# Patient Record
Sex: MALE | Race: WHITE | Employment: OTHER | URBAN - METROPOLITAN AREA
[De-identification: names, ages, dates, MRNs, and addresses within clinical notes are randomized per-mention and may not be internally consistent; named-entity substitution may affect disease eponyms.]

---

## 2019-07-11 ENCOUNTER — OFFICE VISIT (OUTPATIENT)
Dept: VASCULAR SURGERY | Age: 74
End: 2019-07-11
Payer: MEDICARE

## 2019-07-11 DIAGNOSIS — Z98.890 S/P AAA (ABDOMINAL AORTIC ANEURYSM) REPAIR: Primary | Chronic | ICD-10-CM

## 2019-07-11 DIAGNOSIS — Z86.79 S/P AAA (ABDOMINAL AORTIC ANEURYSM) REPAIR: Primary | Chronic | ICD-10-CM

## 2019-07-11 PROCEDURE — G8427 DOCREV CUR MEDS BY ELIG CLIN: HCPCS | Performed by: SURGERY

## 2019-07-11 PROCEDURE — G8421 BMI NOT CALCULATED: HCPCS | Performed by: SURGERY

## 2019-07-11 PROCEDURE — 3017F COLORECTAL CA SCREEN DOC REV: CPT | Performed by: SURGERY

## 2019-07-11 PROCEDURE — 4040F PNEUMOC VAC/ADMIN/RCVD: CPT | Performed by: SURGERY

## 2019-07-11 PROCEDURE — 1036F TOBACCO NON-USER: CPT | Performed by: SURGERY

## 2019-07-11 PROCEDURE — 99213 OFFICE O/P EST LOW 20 MIN: CPT | Performed by: SURGERY

## 2019-07-11 PROCEDURE — 1123F ACP DISCUSS/DSCN MKR DOCD: CPT | Performed by: SURGERY

## 2019-07-11 RX ORDER — LIDOCAINE 40 MG/G
CREAM TOPICAL PRN
COMMUNITY

## 2019-07-11 NOTE — PROGRESS NOTES
repair 2012       Past Surgical History:   Procedure Laterality Date    ABDOMINAL AORTIC ANEURYSM REPAIR, OPEN      AAA repair and aorto-iliac bypass    BLADDER SURGERY      resection for carcinoma   [address]    HERNIA REPAIR         History reviewed. No pertinent family history.     Social History     Socioeconomic History    Marital status:      Spouse name: Not on file    Number of children: Not on file    Years of education: Not on file    Highest education level: Not on file   Occupational History    Not on file   Social Needs    Financial resource strain: Not on file    Food insecurity:     Worry: Not on file     Inability: Not on file    Transportation needs:     Medical: Not on file     Non-medical: Not on file   Tobacco Use    Smoking status: Former Smoker     Packs/day: 2.00     Types: Cigarettes     Last attempt to quit: 2005     Years since quittin.2    Smokeless tobacco: Never Used   Substance and Sexual Activity    Alcohol use: Yes     Comment: rare    Drug use: Not on file    Sexual activity: Not on file   Lifestyle    Physical activity:     Days per week: Not on file     Minutes per session: Not on file    Stress: Not on file   Relationships    Social connections:     Talks on phone: Not on file     Gets together: Not on file     Attends Hoahaoism service: Not on file     Active member of club or organization: Not on file     Attends meetings of clubs or organizations: Not on file     Relationship status: Not on file    Intimate partner violence:     Fear of current or ex partner: Not on file     Emotionally abused: Not on file     Physically abused: Not on file     Forced sexual activity: Not on file   Other Topics Concern    Not on file   Social History Narrative    Not on file       Review of Systems:    Eyes:  No blurring,

## 2019-07-24 ENCOUNTER — HOSPITAL ENCOUNTER (OUTPATIENT)
Dept: CARDIOLOGY | Age: 74
Discharge: HOME | End: 2019-07-24
Payer: MEDICARE

## 2019-07-24 DIAGNOSIS — Z98.890 S/P AAA (ABDOMINAL AORTIC ANEURYSM) REPAIR: Chronic | ICD-10-CM

## 2019-07-24 DIAGNOSIS — Z86.79 S/P AAA (ABDOMINAL AORTIC ANEURYSM) REPAIR: Chronic | ICD-10-CM

## 2019-07-24 PROCEDURE — 93978 VASCULAR STUDY: CPT

## 2019-07-25 ENCOUNTER — TELEPHONE (OUTPATIENT)
Dept: VASCULAR SURGERY | Age: 74
End: 2019-07-25

## 2019-08-16 ENCOUNTER — HOSPITAL ENCOUNTER (OUTPATIENT)
Dept: CT IMAGING | Age: 74
Discharge: HOME | End: 2019-08-18
Payer: MEDICARE

## 2019-08-16 DIAGNOSIS — R93.89 ABNORMAL RADIOLOGIC DENSITY: ICD-10-CM

## 2019-08-16 PROCEDURE — 71250 CT THORAX DX C-: CPT

## 2021-07-21 ENCOUNTER — TELEPHONE (OUTPATIENT)
Dept: VASCULAR SURGERY | Age: 76
End: 2021-07-21

## 2021-07-22 ENCOUNTER — TELEPHONE (OUTPATIENT)
Dept: VASCULAR SURGERY | Age: 76
End: 2021-07-22

## 2021-07-22 ENCOUNTER — OFFICE VISIT (OUTPATIENT)
Dept: VASCULAR SURGERY | Age: 76
End: 2021-07-22
Payer: MEDICARE

## 2021-07-22 VITALS — BODY MASS INDEX: 26.52 KG/M2 | WEIGHT: 175 LBS | HEIGHT: 68 IN

## 2021-07-22 DIAGNOSIS — R09.89 PROMINENT ABDOMINAL AORTIC PULSE: ICD-10-CM

## 2021-07-22 DIAGNOSIS — Z87.891 HISTORY OF TOBACCO USE: ICD-10-CM

## 2021-07-22 DIAGNOSIS — Z86.79 S/P AAA (ABDOMINAL AORTIC ANEURYSM) REPAIR: Primary | Chronic | ICD-10-CM

## 2021-07-22 DIAGNOSIS — Z98.890 S/P AAA (ABDOMINAL AORTIC ANEURYSM) REPAIR: Primary | Chronic | ICD-10-CM

## 2021-07-22 PROCEDURE — 4040F PNEUMOC VAC/ADMIN/RCVD: CPT | Performed by: SURGERY

## 2021-07-22 PROCEDURE — G8417 CALC BMI ABV UP PARAM F/U: HCPCS | Performed by: SURGERY

## 2021-07-22 PROCEDURE — 3017F COLORECTAL CA SCREEN DOC REV: CPT | Performed by: SURGERY

## 2021-07-22 PROCEDURE — G8427 DOCREV CUR MEDS BY ELIG CLIN: HCPCS | Performed by: SURGERY

## 2021-07-22 PROCEDURE — 99214 OFFICE O/P EST MOD 30 MIN: CPT | Performed by: SURGERY

## 2021-07-22 PROCEDURE — 1123F ACP DISCUSS/DSCN MKR DOCD: CPT | Performed by: SURGERY

## 2021-07-22 PROCEDURE — 1036F TOBACCO NON-USER: CPT | Performed by: SURGERY

## 2021-07-22 NOTE — TELEPHONE ENCOUNTER
Scheduled AAA u/s at [facility] 8/3 at 8:30 a.m, Mrs. Yunior Vargas notified and instructed.

## 2021-07-22 NOTE — PROGRESS NOTES
Chief Complaint:   Chief Complaint   Patient presents with    Follow-up     s/p AAA         HPI: Patient came to the office, for follow-up evaluation of resection of abdominal aortic aneurysm that was done many years ago, came to the office after 2 years overall doing well, denies any abdominal pain or back pain    Patient denies any significant changes in his health system since last time      Patient denies any focal lateralizing neurological symptoms like loss of speech, vision or loss of function of extremity    Patient can walk a few blocks, mainly limited due to arthritis of the knee joints, and denies any symptoms of rest pain    No Known Allergies    Current Outpatient Medications   Medication Sig Dispense Refill    lidocaine (LMX) 4 % cream Apply topically as needed for Pain Apply topically as needed.  sertraline (ZOLOFT) 25 MG tablet Take 25 mg by mouth daily      pravastatin (PRAVACHOL) 80 MG tablet Take 40 mg by mouth daily      omeprazole (PRILOSEC) 20 MG capsule Take 40 mg by mouth daily      sucralfate (CARAFATE) 1 GM tablet Take 1 g by mouth 4 times daily      levothyroxine (SYNTHROID) 25 MCG tablet Take 25 mcg by mouth Daily      Misc Natural Products (FIBER 7) POWD Take  by mouth.  lisinopril (PRINIVIL;ZESTRIL) 10 MG tablet Take 10 mg by mouth daily.  metoprolol (LOPRESSOR) 50 MG tablet Take 50 mg by mouth 2 times daily.  amlodipine (NORVASC) 10 MG tablet Take 10 mg by mouth daily.  Coenzyme Q10 (CO Q 10) 10 MG CAPS Take  by mouth daily.  fish oil-omega-3 fatty acids 1000 MG capsule Take 2 g by mouth daily.  aspirin 81 MG chewable tablet Take 81 mg by mouth daily.  vitamin D (CHOLECALCIFEROL) 1000 UNIT TABS tablet Take 2,000 Units by mouth daily. No current facility-administered medications for this visit.        Past Medical History:   Diagnosis Date    Abdominal aortic aneurysm ([facility] [address]     CAD (coronary artery disease)     Cancer [facility])     bladder s/p resection     History of heart attack 2004    Hyperlipidemia     Hypertension     Hypothyroidism     Prominent abdominal aortic pulse 2021    S/P AAA (abdominal aortic aneurysm) repair 2012       Past Surgical History:   Procedure Laterality Date    ABDOMINAL AORTIC ANEURYSM REPAIR, OPEN      AAA repair and aorto-iliac bypass    BLADDER SURGERY      resection for carcinoma   [facility] [place]      [facility]    HERNIA REPAIR         History reviewed. No pertinent family history. Social History     Socioeconomic History    Marital status:      Spouse name: Not on file    Number of children: Not on file    Years of education: Not on file    Highest education level: Not on file   Occupational History    Not on file   Tobacco Use    Smoking status: Former Smoker     Packs/day: 2.00     Types: Cigarettes     Quit date: 2005     Years since quittin.2    Smokeless tobacco: Never Used   Vaping Use    Vaping Use: Never used   Substance and Sexual Activity    Alcohol use: Yes     Comment: rare    Drug use: Never    Sexual activity: Not on file   Other Topics Concern    Not on file   Social History Narrative    Not on file     Social Determinants of Health     Financial Resource Strain:     Difficulty of Paying Living Expenses:    Food Insecurity:     Worried About Running Out of Food in the Last Year:     [address] in the Last Year:    Transportation Needs:     Lack of Transportation (Medical):      Lack of Transportation (Non-Medical):    Physical Activity:     Days of Exercise per Week:     Minutes of Exercise per Session:    Stress:     Feeling of Stress :    Social Connections:     Frequency of Communication with Friends and Family:     Frequency of Social Gatherings with Friends and Family:     Attends Anabaptism Services:  Active Member of Clubs or Organizations:     Attends Club or Organization Meetings:     Marital Status:    Intimate Partner Violence:     Fear of Current or Ex-Partner:     Emotionally Abused:     Physically Abused:     Sexually Abused:        Review of Systems:    Eyes:  No blurring, diplopia or vision loss. Respiratory:  No cough, pleuritic chest pain, dyspnea, or wheezing. Cardiovascular: No angina, palpitations . Hypertension, hyperlipidemia  Musculoskeletal:  No arthritis or weakness. Neurologic:  No paralysis, paresis, paresthesia, seizures or headache. Endocrinology: Hypothyroidism  Gastrointestinal: GERD        Physical Exam:  General appearance:  Alert, awake, oriented x 3. No distress. Eyes:  Conjunctivae appear normal; PERRL  Neck:  No jugular venous distention, lymphadenopathy or thyromegaly. No evidence of carotid bruit  Lungs:  Clear to ausculation bilaterally. No rhonchi, crackles, wheezes  Heart:  Regular rate and rhythm. No rub or murmur  Abdomen:  Soft, non-tender. No masses, organomegaly. Minimally prominent pulse noted on deep palpation  Musculoskeletal : No joint effusions, tenderness swelling    Neuro: Speech is intact. Moving all extremities. No focal motor or sensory deficits      Extremities:  Both feet are warm to touch. The color of both feet is normal.        Pulses Right  Left    Brachial 3 3    Radial    3=normal   Femoral 2 2  2=diminished   Popliteal    1=barely palpable   Dorsalis pedis    0=absent   Posterior tibial 2 2  4=aneurysmal             Other pertinent information:1. The past medical records were reviewed. Assessment:    1. S/P AAA (abdominal aortic aneurysm) repair    2. Prominent abdominal aortic pulse    3.  History of tobacco use              Plan:       Discussed the patient regarding all options, recommended follow-up ultrasound abdominal aorta but call and come to the hospital with abdominal pain and back pain, all his questions were answered, to call 2 days after the test is done to discuss the test results              Patient was instructed to continue walking program and to call if any worsening of symptoms and to call if any focal lateralizing neurological symptoms like loss of speech, vision or loss of function of extremity. All the questions were answered. Orders Placed This Encounter   Procedures    US RETROPERITONEAL LIMITED             Indicated follow-up: Return in about 2 years (around 7/22/2023), or if symptoms worsen or fail to improve.

## 2021-08-03 ENCOUNTER — HOSPITAL ENCOUNTER (OUTPATIENT)
Dept: ULTRASOUND IMAGING | Age: 76
Discharge: HOME | End: 2021-08-05
Payer: MEDICARE

## 2021-08-03 DIAGNOSIS — Z86.79 S/P AAA (ABDOMINAL AORTIC ANEURYSM) REPAIR: Chronic | ICD-10-CM

## 2021-08-03 DIAGNOSIS — R09.89 PROMINENT ABDOMINAL AORTIC PULSE: ICD-10-CM

## 2021-08-03 DIAGNOSIS — Z98.890 S/P AAA (ABDOMINAL AORTIC ANEURYSM) REPAIR: Chronic | ICD-10-CM

## 2021-08-09 ENCOUNTER — HOSPITAL ENCOUNTER (OUTPATIENT)
Dept: ULTRASOUND IMAGING | Age: 76
Discharge: HOME | End: 2021-08-11
Payer: MEDICARE

## 2021-08-09 PROCEDURE — 76775 US EXAM ABDO BACK WALL LIM: CPT

## 2021-08-10 ENCOUNTER — TELEPHONE (OUTPATIENT)
Dept: VASCULAR SURGERY | Age: 76
End: 2021-08-10

## 2021-08-10 NOTE — TELEPHONE ENCOUNTER
Called and informed patient that ultrasound of abdomen satisfactory, no change, keep next appointment.

## 2022-03-15 ENCOUNTER — HOSPITAL ENCOUNTER (OUTPATIENT)
Age: 77
Discharge: HOME | End: 2022-03-17

## 2022-03-15 LAB
ABO/RH: NORMAL
ANTIBODY SCREEN: NORMAL

## 2022-03-15 PROCEDURE — 87081 CULTURE SCREEN ONLY: CPT

## 2022-03-15 PROCEDURE — 86901 BLOOD TYPING SEROLOGIC RH(D): CPT

## 2022-03-15 PROCEDURE — 86900 BLOOD TYPING SEROLOGIC ABO: CPT

## 2022-03-15 PROCEDURE — 86850 RBC ANTIBODY SCREEN: CPT

## 2022-03-17 LAB — MRSA CULTURE ONLY: NORMAL

## 2022-03-23 ENCOUNTER — HOSPITAL ENCOUNTER (OUTPATIENT)
Age: 77
Discharge: HOME | End: 2022-03-25

## 2022-03-23 LAB
ANION GAP SERPL CALCULATED.3IONS-SCNC: 9 MMOL/L (ref 7–16)
BUN BLDV-MCNC: 34 MG/DL (ref 6–23)
CALCIUM SERPL-MCNC: 8.4 MG/DL (ref 8.6–10.2)
CHLORIDE BLD-SCNC: 106 MMOL/L (ref 98–107)
CO2: 21 MMOL/L (ref 22–29)
CREAT SERPL-MCNC: 1.7 MG/DL (ref 0.7–1.2)
GFR AFRICAN AMERICAN: 48
GFR NON-AFRICAN AMERICAN: 39 ML/MIN/1.73
GLUCOSE BLD-MCNC: 180 MG/DL (ref 74–99)
HCT VFR BLD CALC: 29.7 % (ref 37–54)
HEMOGLOBIN: 9.1 G/DL (ref 12.5–16.5)
MCH RBC QN AUTO: 30.8 PG (ref 26–35)
MCHC RBC AUTO-ENTMCNC: 30.6 % (ref 32–34.5)
MCV RBC AUTO: 100.7 FL (ref 80–99.9)
PDW BLD-RTO: 12.8 FL (ref 11.5–15)
PLATELET # BLD: 156 E9/L (ref 130–450)
PMV BLD AUTO: 10.8 FL (ref 7–12)
POTASSIUM SERPL-SCNC: 4.7 MMOL/L (ref 3.5–5)
RBC # BLD: 2.95 E12/L (ref 3.8–5.8)
SODIUM BLD-SCNC: 136 MMOL/L (ref 132–146)
WBC # BLD: 11 E9/L (ref 4.5–11.5)

## 2022-03-23 PROCEDURE — 80048 BASIC METABOLIC PNL TOTAL CA: CPT

## 2022-03-23 PROCEDURE — 85027 COMPLETE CBC AUTOMATED: CPT

## 2022-03-24 ENCOUNTER — HOSPITAL ENCOUNTER (OUTPATIENT)
Age: 77
Discharge: HOME | End: 2022-03-26

## 2022-03-24 LAB
ANION GAP SERPL CALCULATED.3IONS-SCNC: 13 MMOL/L (ref 7–16)
BUN BLDV-MCNC: 42 MG/DL (ref 6–23)
CALCIUM SERPL-MCNC: 8.6 MG/DL (ref 8.6–10.2)
CHLORIDE BLD-SCNC: 107 MMOL/L (ref 98–107)
CO2: 19 MMOL/L (ref 22–29)
CREAT SERPL-MCNC: 1.8 MG/DL (ref 0.7–1.2)
GFR AFRICAN AMERICAN: 45
GFR NON-AFRICAN AMERICAN: 37 ML/MIN/1.73
GLUCOSE BLD-MCNC: 156 MG/DL (ref 74–99)
HCT VFR BLD CALC: 22.8 % (ref 37–54)
HCT VFR BLD CALC: 23.7 % (ref 37–54)
HEMOGLOBIN: 7.4 G/DL (ref 12.5–16.5)
HEMOGLOBIN: 7.7 G/DL (ref 12.5–16.5)
MCH RBC QN AUTO: 31.4 PG (ref 26–35)
MCHC RBC AUTO-ENTMCNC: 32.5 % (ref 32–34.5)
MCV RBC AUTO: 96.7 FL (ref 80–99.9)
PDW BLD-RTO: 12.9 FL (ref 11.5–15)
PLATELET # BLD: 147 E9/L (ref 130–450)
PMV BLD AUTO: 10.8 FL (ref 7–12)
POTASSIUM SERPL-SCNC: 4.6 MMOL/L (ref 3.5–5)
RBC # BLD: 2.45 E12/L (ref 3.8–5.8)
SODIUM BLD-SCNC: 139 MMOL/L (ref 132–146)
WBC # BLD: 15.8 E9/L (ref 4.5–11.5)

## 2022-03-24 PROCEDURE — 85014 HEMATOCRIT: CPT

## 2022-03-24 PROCEDURE — 85027 COMPLETE CBC AUTOMATED: CPT

## 2022-03-24 PROCEDURE — 80048 BASIC METABOLIC PNL TOTAL CA: CPT

## 2022-03-24 PROCEDURE — 85018 HEMOGLOBIN: CPT

## 2023-01-13 ENCOUNTER — APPOINTMENT (OUTPATIENT)
Dept: CT IMAGING | Age: 78
DRG: 025 | End: 2023-01-13
Payer: MEDICARE

## 2023-01-13 ENCOUNTER — ANESTHESIA EVENT (OUTPATIENT)
Dept: OPERATING ROOM | Age: 78
End: 2023-01-13
Payer: MEDICARE

## 2023-01-13 ENCOUNTER — APPOINTMENT (OUTPATIENT)
Dept: GENERAL RADIOLOGY | Age: 78
DRG: 025 | End: 2023-01-13
Payer: MEDICARE

## 2023-01-13 ENCOUNTER — HOSPITAL ENCOUNTER (INPATIENT)
Age: 78
LOS: 5 days | Discharge: HOME | DRG: 025 | End: 2023-01-18
Attending: EMERGENCY MEDICINE | Admitting: SURGERY
Payer: MEDICARE

## 2023-01-13 DIAGNOSIS — S06.5XAA SDH (SUBDURAL HEMATOMA): Primary | ICD-10-CM

## 2023-01-13 LAB
ALBUMIN SERPL-MCNC: 3.6 G/DL (ref 3.5–5.2)
ALP BLD-CCNC: 70 U/L (ref 40–129)
ALT SERPL-CCNC: 15 U/L (ref 0–40)
ANGLE (CLOT STRENGTH): 75.4 DEGREE (ref 59–74)
ANION GAP SERPL CALCULATED.3IONS-SCNC: 13 MMOL/L (ref 7–16)
APTT: 30.8 SEC (ref 24.5–35.1)
AST SERPL-CCNC: 14 U/L (ref 0–39)
BASOPHILS ABSOLUTE: 0.05 E9/L (ref 0–0.2)
BASOPHILS RELATIVE PERCENT: 0.7 % (ref 0–2)
BILIRUB SERPL-MCNC: 0.3 MG/DL (ref 0–1.2)
BUN BLDV-MCNC: 26 MG/DL (ref 6–23)
CALCIUM SERPL-MCNC: 9.1 MG/DL (ref 8.6–10.2)
CHLORIDE BLD-SCNC: 108 MMOL/L (ref 98–107)
CO2: 20 MMOL/L (ref 22–29)
CREAT SERPL-MCNC: 1.6 MG/DL (ref 0.7–1.2)
EOSINOPHILS ABSOLUTE: 0.14 E9/L (ref 0.05–0.5)
EOSINOPHILS RELATIVE PERCENT: 2.1 % (ref 0–6)
EPL-TEG: 0.3 % (ref 0–15)
G-TEG: 7.7 K D/SC (ref 4.5–11)
GFR SERPL CREATININE-BSD FRML MDRD: 44 ML/MIN/1.73
GLUCOSE BLD-MCNC: 159 MG/DL (ref 74–99)
HCT VFR BLD CALC: 41.9 % (ref 37–54)
HEMOGLOBIN: 13.7 G/DL (ref 12.5–16.5)
IMMATURE GRANULOCYTES #: 0.01 E9/L
IMMATURE GRANULOCYTES %: 0.1 % (ref 0–5)
INR BLD: 1.2
K (CLOTTING TIME): 1.3 MIN (ref 1–3)
LY30 (FIBRINOLYSIS): 0.3 % (ref 0–8)
LYMPHOCYTES ABSOLUTE: 1.51 E9/L (ref 1.5–4)
LYMPHOCYTES RELATIVE PERCENT: 22.4 % (ref 20–42)
MA (MAX AMPLITUDE): 60.5 MM (ref 50–70)
MCH RBC QN AUTO: 30.8 PG (ref 26–35)
MCHC RBC AUTO-ENTMCNC: 32.7 % (ref 32–34.5)
MCV RBC AUTO: 94.2 FL (ref 80–99.9)
MONOCYTES ABSOLUTE: 0.69 E9/L (ref 0.1–0.95)
MONOCYTES RELATIVE PERCENT: 10.2 % (ref 2–12)
NEUTROPHILS ABSOLUTE: 4.35 E9/L (ref 1.8–7.3)
NEUTROPHILS RELATIVE PERCENT: 64.5 % (ref 43–80)
PDW BLD-RTO: 13.3 FL (ref 11.5–15)
PLATELET # BLD: 149 E9/L (ref 130–450)
PMV BLD AUTO: 9.6 FL (ref 7–12)
POTASSIUM SERPL-SCNC: 3.8 MMOL/L (ref 3.5–5)
PROTHROMBIN TIME: 12.6 SEC (ref 9.3–12.4)
R (REACTION TIME): 3.1 MIN (ref 5–10)
RBC # BLD: 4.45 E12/L (ref 3.8–5.8)
SODIUM BLD-SCNC: 141 MMOL/L (ref 132–146)
TOTAL PROTEIN: 6.9 G/DL (ref 6.4–8.3)
WBC # BLD: 6.8 E9/L (ref 4.5–11.5)

## 2023-01-13 PROCEDURE — 87081 CULTURE SCREEN ONLY: CPT

## 2023-01-13 PROCEDURE — 71045 X-RAY EXAM CHEST 1 VIEW: CPT

## 2023-01-13 PROCEDURE — 74177 CT ABD & PELVIS W/CONTRAST: CPT

## 2023-01-13 PROCEDURE — 70450 CT HEAD/BRAIN W/O DYE: CPT

## 2023-01-13 PROCEDURE — 6360000002 HC RX W HCPCS: Performed by: EMERGENCY MEDICINE

## 2023-01-13 PROCEDURE — 85347 COAGULATION TIME ACTIVATED: CPT

## 2023-01-13 PROCEDURE — 85610 PROTHROMBIN TIME: CPT

## 2023-01-13 PROCEDURE — 85730 THROMBOPLASTIN TIME PARTIAL: CPT

## 2023-01-13 PROCEDURE — 85384 FIBRINOGEN ACTIVITY: CPT

## 2023-01-13 PROCEDURE — 2580000003 HC RX 258: Performed by: STUDENT IN AN ORGANIZED HEALTH CARE EDUCATION/TRAINING PROGRAM

## 2023-01-13 PROCEDURE — 6360000002 HC RX W HCPCS: Performed by: STUDENT IN AN ORGANIZED HEALTH CARE EDUCATION/TRAINING PROGRAM

## 2023-01-13 PROCEDURE — 6360000004 HC RX CONTRAST MEDICATION: Performed by: RADIOLOGY

## 2023-01-13 PROCEDURE — 80053 COMPREHEN METABOLIC PANEL: CPT

## 2023-01-13 PROCEDURE — 85576 BLOOD PLATELET AGGREGATION: CPT

## 2023-01-13 PROCEDURE — 2000000000 HC ICU R&B

## 2023-01-13 PROCEDURE — 6370000000 HC RX 637 (ALT 250 FOR IP): Performed by: STUDENT IN AN ORGANIZED HEALTH CARE EDUCATION/TRAINING PROGRAM

## 2023-01-13 PROCEDURE — 72125 CT NECK SPINE W/O DYE: CPT

## 2023-01-13 PROCEDURE — 71260 CT THORAX DX C+: CPT

## 2023-01-13 PROCEDURE — 85025 COMPLETE CBC W/AUTO DIFF WBC: CPT

## 2023-01-13 PROCEDURE — 36415 COLL VENOUS BLD VENIPUNCTURE: CPT

## 2023-01-13 PROCEDURE — 99285 EMERGENCY DEPT VISIT HI MDM: CPT

## 2023-01-13 RX ORDER — OXYCODONE HYDROCHLORIDE 10 MG/1
10 TABLET ORAL EVERY 4 HOURS PRN
Status: DISCONTINUED | OUTPATIENT
Start: 2023-01-13 | End: 2023-01-15

## 2023-01-13 RX ORDER — ACETAMINOPHEN 325 MG/1
650 TABLET ORAL EVERY 6 HOURS PRN
Status: DISCONTINUED | OUTPATIENT
Start: 2023-01-13 | End: 2023-01-18 | Stop reason: HOSPADM

## 2023-01-13 RX ORDER — LABETALOL HYDROCHLORIDE 5 MG/ML
10 INJECTION, SOLUTION INTRAVENOUS EVERY 30 MIN PRN
Status: DISCONTINUED | OUTPATIENT
Start: 2023-01-13 | End: 2023-01-18 | Stop reason: HOSPADM

## 2023-01-13 RX ORDER — PRAVASTATIN SODIUM 20 MG
80 TABLET ORAL NIGHTLY
Status: DISCONTINUED | OUTPATIENT
Start: 2023-01-13 | End: 2023-01-18 | Stop reason: HOSPADM

## 2023-01-13 RX ORDER — LEVETIRACETAM 5 MG/ML
500 INJECTION INTRAVASCULAR ONCE
Status: COMPLETED | OUTPATIENT
Start: 2023-01-13 | End: 2023-01-13

## 2023-01-13 RX ORDER — AMLODIPINE BESYLATE 10 MG/1
10 TABLET ORAL DAILY
Status: DISCONTINUED | OUTPATIENT
Start: 2023-01-14 | End: 2023-01-18 | Stop reason: HOSPADM

## 2023-01-13 RX ORDER — METOPROLOL TARTRATE 50 MG/1
50 TABLET, FILM COATED ORAL 2 TIMES DAILY
Status: DISCONTINUED | OUTPATIENT
Start: 2023-01-13 | End: 2023-01-18 | Stop reason: HOSPADM

## 2023-01-13 RX ORDER — ONDANSETRON 2 MG/ML
4 INJECTION INTRAMUSCULAR; INTRAVENOUS EVERY 6 HOURS PRN
Status: DISCONTINUED | OUTPATIENT
Start: 2023-01-13 | End: 2023-01-18 | Stop reason: HOSPADM

## 2023-01-13 RX ORDER — SODIUM CHLORIDE 0.9 % (FLUSH) 0.9 %
5-40 SYRINGE (ML) INJECTION PRN
Status: DISCONTINUED | OUTPATIENT
Start: 2023-01-13 | End: 2023-01-18 | Stop reason: HOSPADM

## 2023-01-13 RX ORDER — POLYETHYLENE GLYCOL 3350 17 G/17G
17 POWDER, FOR SOLUTION ORAL 2 TIMES DAILY
Status: DISCONTINUED | OUTPATIENT
Start: 2023-01-13 | End: 2023-01-18 | Stop reason: HOSPADM

## 2023-01-13 RX ORDER — SODIUM CHLORIDE 0.9 % (FLUSH) 0.9 %
5-40 SYRINGE (ML) INJECTION EVERY 12 HOURS SCHEDULED
Status: DISCONTINUED | OUTPATIENT
Start: 2023-01-13 | End: 2023-01-18 | Stop reason: HOSPADM

## 2023-01-13 RX ORDER — ROSUVASTATIN CALCIUM 20 MG/1
20 TABLET, COATED ORAL DAILY
COMMUNITY

## 2023-01-13 RX ORDER — LEVETIRACETAM 500 MG/1
500 TABLET ORAL 2 TIMES DAILY
Status: DISCONTINUED | OUTPATIENT
Start: 2023-01-14 | End: 2023-01-18 | Stop reason: HOSPADM

## 2023-01-13 RX ORDER — SODIUM CHLORIDE 9 MG/ML
25 INJECTION, SOLUTION INTRAVENOUS PRN
Status: DISCONTINUED | OUTPATIENT
Start: 2023-01-13 | End: 2023-01-18 | Stop reason: HOSPADM

## 2023-01-13 RX ORDER — HYDRALAZINE HYDROCHLORIDE 20 MG/ML
10 INJECTION INTRAMUSCULAR; INTRAVENOUS EVERY 30 MIN PRN
Status: DISCONTINUED | OUTPATIENT
Start: 2023-01-13 | End: 2023-01-18 | Stop reason: HOSPADM

## 2023-01-13 RX ORDER — OXYCODONE HYDROCHLORIDE 5 MG/1
5 TABLET ORAL EVERY 4 HOURS PRN
Status: DISCONTINUED | OUTPATIENT
Start: 2023-01-13 | End: 2023-01-15

## 2023-01-13 RX ORDER — LEVETIRACETAM 5 MG/ML
500 INJECTION INTRAVASCULAR EVERY 12 HOURS
Status: DISCONTINUED | OUTPATIENT
Start: 2023-01-14 | End: 2023-01-13

## 2023-01-13 RX ORDER — ONDANSETRON 4 MG/1
4 TABLET, ORALLY DISINTEGRATING ORAL EVERY 8 HOURS PRN
Status: DISCONTINUED | OUTPATIENT
Start: 2023-01-13 | End: 2023-01-18 | Stop reason: HOSPADM

## 2023-01-13 RX ADMIN — SODIUM CHLORIDE, PRESERVATIVE FREE 10 ML: 5 INJECTION INTRAVENOUS at 20:30

## 2023-01-13 RX ADMIN — METOPROLOL TARTRATE 50 MG: 50 TABLET, FILM COATED ORAL at 20:30

## 2023-01-13 RX ADMIN — POLYETHYLENE GLYCOL 3350 17 G: 17 POWDER, FOR SOLUTION ORAL at 20:30

## 2023-01-13 RX ADMIN — IOPAMIDOL 90 ML: 755 INJECTION, SOLUTION INTRAVENOUS at 20:02

## 2023-01-13 RX ADMIN — PRAVASTATIN SODIUM 80 MG: 20 TABLET ORAL at 20:34

## 2023-01-13 RX ADMIN — LEVETIRACETAM 500 MG: 5 INJECTION INTRAVENOUS at 17:28

## 2023-01-13 RX ADMIN — HYDRALAZINE HYDROCHLORIDE 10 MG: 20 INJECTION INTRAMUSCULAR; INTRAVENOUS at 22:55

## 2023-01-13 ASSESSMENT — PAIN SCALES - GENERAL: PAINLEVEL_OUTOF10: 2

## 2023-01-13 ASSESSMENT — PAIN DESCRIPTION - PAIN TYPE: TYPE: ACUTE PAIN

## 2023-01-13 ASSESSMENT — PAIN DESCRIPTION - ORIENTATION: ORIENTATION: MID;RIGHT;LEFT

## 2023-01-13 ASSESSMENT — LIFESTYLE VARIABLES
HOW OFTEN DO YOU HAVE A DRINK CONTAINING ALCOHOL: NEVER
HOW MANY STANDARD DRINKS CONTAINING ALCOHOL DO YOU HAVE ON A TYPICAL DAY: PATIENT DOES NOT DRINK

## 2023-01-13 ASSESSMENT — PAIN DESCRIPTION - LOCATION: LOCATION: HEAD

## 2023-01-13 ASSESSMENT — PAIN DESCRIPTION - DESCRIPTORS: DESCRIPTORS: OTHER (COMMENT)

## 2023-01-13 ASSESSMENT — PAIN DESCRIPTION - ONSET: ONSET: ON-GOING

## 2023-01-13 ASSESSMENT — PAIN DESCRIPTION - FREQUENCY: FREQUENCY: CONTINUOUS

## 2023-01-13 ASSESSMENT — PAIN - FUNCTIONAL ASSESSMENT: PAIN_FUNCTIONAL_ASSESSMENT: ACTIVITIES ARE NOT PREVENTED

## 2023-01-13 NOTE — ED NOTES
Surgery phoned to say pt would be going to surgery at 730 am tomorrow morning      Isatu Maya RN  01/13/23 3847

## 2023-01-13 NOTE — H&P
TRAUMA HISTORY & PHYSICAL  Surgical Resident/Advance Practice Nurse  1/13/2023  5:13 PM    PRIMARY SURVEY    CHIEF COMPLAINT:  Trauma consult. Patient is a 81-year-old male that presents to the ED from the [place] after a persistent right-sided headache. Patient states he fell on December 8 hitting his head on the corner of a wall. Patient states he was evaluated at that time. Patient states at that time he has had a persistent right sided headache. Patient states he fell again 3 days ago over the end table hitting her right side of his chest and his head. Patient states his headache worsened somewhat after the fall. Patient was evaluated by the [place] and sent in for CT of the head and cervical spine to evaluate for traumatic injury. Patient was found on CT of the head to have bilateral subdural hematomas worse on the left with approximate 7 mm shift. Neurosurgery was consulted for subdural hematoma. Cervical focal spine was noted to be negative for acute injury. Patient denies loss consciousness. Patient denies any neurologic deficits. Patient is on ASA 81. AIRWAY:   Airway Normal  EMS ETT Absent  Noisy respirations Absent  Retractions: Absent  Vomiting/bleeding: Absent      BREATHING:    Midaxillary breath sound left:  Normal  Midaxillary breath sound right:  Normal    Cough sound intensity:  fair   FiO2:  Room air    SMI 1500 mL.       CIRCULATION:   Femerol pulse intensity: Strong  Palpebral conjunctiva: Red    Vitals:    01/13/23 1500   BP: (!) 144/79   Pulse:    Resp: 18   Temp:    SpO2:        Vitals:    01/13/23 1442 01/13/23 1500   BP:  (!) 144/79   Pulse: 77    Resp:  18   Temp: 97.8 °F (36.6 °C)    SpO2: 98%    Weight:  172 lb (78 kg)   Height:  5' 10\" (1.778 m)        FAST EXAM: Deferred    Central Nervous System    GCS Initial 15 minutes   Eye  Motor  Verbal 4 - Opens eyes on own  6 - Follows simple motor commands  5 - Alert and oriented 4 - Opens eyes on own  6 - Follows simple motor commands  5 - Alert and oriented     Neuromuscular blockade: No  Pupil size:  Left 3 mm    Right 3 mm  Pupil reaction: Yes    Wiggles fingers: Left Yes Right Yes  Wiggles toes: Left Yes   Right Yes    Hand grasp:   Left  Present      Right  Present  Plantar flexion: Left  Present      Right   Present    Loss of consciousness:  No    History Obtained From:  Patient & EMS  Private Medical Doctor: Celso Meredith DO      Pre-exisiting Medical History:  yes    Conditions: CAD, AAA, prior MI, HTN, HLD    Medications: Omeprazole, Carafate, lisinopril, statin, Zoloft, Lopressor, Norvasc, ASA 81    Allergies: No known allergies    Social History:   Tobacco use:  none  Alcohol use:  none  Illicit drug use:  no history of illicit drug use    Past Surgical History: Open AAA repair and bilateral inguinal hernia repair    Anticoagulant use: None  Antiplatelet use:   ASA    NSAID use in last 72 hours: no  Taken PCN in past:  yes  Last food/drink: Unknown  Last tetanus: Unknown    Family History:   No family history of anesthesia complications    Complaints:   Head: Moderate bilateral frontal headache  Neck:   None  Chest:   Moderate right-sided chest wall pain  Back:   None  Abdomen:   None  Extremities:   None  Comments:     Review of systems:  All negative unless otherwise noted. SECONDARY SURVEY  Head/scalp: Atraumatic    Face: Atraumatic    Eyes/ears/nose: Atraumatic    Pharynx/mouth: Atraumatic    Neck: Atraumatic     Cervical spine tenderness:   Cervical collar not indicated  Pain:  none  ROM: Intact    Chest wall: Ecchymosis/tenderness noted over the right chest wall    Heart:  Regular rate & rhythm    Abdomen: Atraumatic. Soft ND  Tenderness:  none    Pelvis: Atraumatic  Tenderness: none    Thoracolumbar spine: Atraumatic  Tenderness:  none    Genitourinary:  Atraumatic. No blood or urine noted    Rectum: Atraumatic. No blood noted. Perineum: Atraumatic. No blood or urine noted.       Extremities: Sensory normal  Motor normal    Distal Pulses  Left arm normal  Right arm normal  Left leg normal  Right leg normal    Capillary refill  Left arm normal  Right arm normal  Left leg normal  Right leg normal    Procedures in ED:   None    In the event of Emergency Blood Transfusion:  Due to the critical condition of this patient, I request the immediate release of blood products for emergency transfusion secondary to shock. I understand the increased risks incurred by the lack of complete transfusion testing.       Radiology: CT Head , CT Cervical spine , CT Chest , and CT Abdomen     Consultations:  Neurosurgery    Admission/Diagnosis: Bilateral subdural hematoma with shift    Plan of Treatment:  Admit to SICU  TEG  CT chest, abdomen and pelvis  Tertiary exam  Discussed imaging with neurosurgeon with plans for left-sided bur hole placement 1/14  Keppra 500 twice daily for seizure prophylaxis  Follow up labs/ coags    Plan discussed with Dr. Yaakov Beard    at 1/13/2023 on 5:13 PM    Electronically signed by Jose G Bradley DO on 1/13/2023 at 5:13 PM

## 2023-01-13 NOTE — ED PROVIDER NOTES
Department of Emergency Medicine   ED  Provider Note  Admit Date/RoomTime: 1/13/2023  3:20 PM  ED Room: [address] [place] [address]          History of Present Illness:  1/13/23, Time: 5:58 PM EST  Chief Complaint   Patient presents with    Fall     Tripped on steps fell down 1 step 3 weeks ago, +hit head, -LOC, -thinners. C/O headaches. Kacy Leung is a 69 y.o. male presenting to the ED for fall. Patient had mechanical fall about 3 weeks ago. Tripped and hit his head. There is no loss conscious, he is on no anticoagulation. Has been having intermittent headaches since. Was seen by his PCP and sent in for evaluation. Denies neck pain, blurred vision, cough, sputum, paresthesias, or any other symptoms. Review of Systems:   Pertinent positives and negatives are stated within HPI, all other systems reviewed and are negative.        --------------------------------------------- PAST HISTORY ---------------------------------------------  Past Medical History:  has a past medical history of Abdominal aortic aneurysm, CAD (coronary artery disease), Cancer ([facility] [address] History of heart attack, Hyperlipidemia, Hypertension, Hypothyroidism, Prominent abdominal aortic pulse, and S/P AAA (abdominal aortic aneurysm) repair. Past Surgical History:  has a past surgical history that includes Bladder surgery (1994); AAA repair, open (2007); hernia repair; Coronary angioplasty with stent; cardiovascular stress test; and Cardiac catheterization. Social History:  reports that he quit smoking about 17 years ago. His smoking use included cigarettes. He smoked an average of 2 packs per day. He has never used smokeless tobacco. He reports current alcohol use. He reports that he does not use drugs. Family History: family history is not on file. . Unless otherwise noted, family history is non contributory    The patients home medications have been reviewed.     Allergies: Patient has no known allergies. ---------------------------------------------------PHYSICAL EXAM--------------------------------------    Constitutional/General: Alert and oriented x3  Head: Normocephalic and atraumatic  Eyes: PERRL, EOMI, sclera non icteric  Mouth: Oropharynx clear, handling secretions, no trismus, no asymmetry of the posterior oropharynx or uvular edema  Neck: Supple, full ROM, no stridor, no meningeal signs  Respiratory: Lungs clear to auscultation bilaterally, no wheezes, rales, or rhonchi. Not in respiratory distress  Cardiovascular:  Regular rate. Regular rhythm. 2+ distal pulses. Equal extremity pulses. Chest: No chest wall tenderness  GI:  Abdomen Soft, Non tender, Non distended. No rebound, guarding, or rigidity. No pulsatile masses. Musculoskeletal: Moves all extremities x 4. Warm and well perfused, no clubbing, cyanosis, or edema. Capillary refill <3 seconds  Integument: skin warm and dry. No rashes. Neurologic: GCS 15, no focal deficits, symmetric strength 5/5 in the upper and lower extremities bilaterally. NIH is 0  Psychiatric: Normal Affect          -------------------------------------------------- RESULTS -------------------------------------------------  I have personally reviewed all laboratory and imaging results for this patient. Results are listed below.      LABS: (Lab results interpreted by me)  Results for orders placed or performed during the hospital encounter of 01/13/23   CBC with Auto Differential   Result Value Ref Range    WBC 6.8 4.5 - 11.5 E9/L    RBC 4.45 3.80 - 5.80 E12/L    Hemoglobin 13.7 12.5 - 16.5 g/dL    Hematocrit 41.9 37.0 - 54.0 %    MCV 94.2 80.0 - 99.9 fL    MCH 30.8 26.0 - 35.0 pg    MCHC 32.7 32.0 - 34.5 %    RDW 13.3 11.5 - 15.0 fL    Platelets 645 675 - 568 E9/L    MPV 9.6 7.0 - 12.0 fL    Neutrophils % 64.5 43.0 - 80.0 %    Immature Granulocytes % 0.1 0.0 - 5.0 %    Lymphocytes % 22.4 20.0 - 42.0 %    Monocytes % 10.2 2.0 - 12.0 %    Eosinophils % 2.1 0.0 - 6.0 %    Basophils % 0.7 0.0 - 2.0 %    Neutrophils Absolute 4.35 1.80 - 7.30 E9/L    Immature Granulocytes # 0.01 E9/L    Lymphocytes Absolute 1.51 1.50 - 4.00 E9/L    Monocytes Absolute 0.69 0.10 - 0.95 E9/L    Eosinophils Absolute 0.14 0.05 - 0.50 E9/L    Basophils Absolute 0.05 0.00 - 0.20 E9/L   Comprehensive Metabolic Panel   Result Value Ref Range    Sodium 141 132 - 146 mmol/L    Potassium 3.8 3.5 - 5.0 mmol/L    Chloride 108 (H) 98 - 107 mmol/L    CO2 20 (L) 22 - 29 mmol/L    Anion Gap 13 7 - 16 mmol/L    Glucose 159 (H) 74 - 99 mg/dL    BUN 26 (H) 6 - 23 mg/dL    Creatinine 1.6 (H) 0.7 - 1.2 mg/dL    Est, Glom Filt Rate 44 >=60 mL/min/1.73    Calcium 9.1 8.6 - 10.2 mg/dL    Total Protein 6.9 6.4 - 8.3 g/dL    Albumin 3.6 3.5 - 5.2 g/dL    Total Bilirubin 0.3 0.0 - 1.2 mg/dL    Alkaline Phosphatase 70 40 - 129 U/L    ALT 15 0 - 40 U/L    AST 14 0 - 39 U/L   APTT   Result Value Ref Range    aPTT 30.8 24.5 - 35.1 sec   Protime-INR   Result Value Ref Range    Protime 12.6 (H) 9.3 - 12.4 sec    INR 1.2    ,       RADIOLOGY:  Interpreted by Radiologist unless otherwise specified  XR CHEST PORTABLE   Final Result   No acute cardiopulmonary disease. CT HEAD WO CONTRAST   Final Result   1. Bilateral mixed subacute and acute subdural hemorrhage, left greater than   right. There is approximately 7 mm of rightward midline shift. No evidence   of transtentorial herniation. 2. No acute fracture or subluxation within the cervical spine. 3. 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 Findings discussed with Nuria Horvath via telephone on 01/13/2023 at 1610   hours [facility] Standard time. CT CERVICAL SPINE WO CONTRAST   Final Result   1. Bilateral mixed subacute and acute subdural hemorrhage, left greater than   right. There is approximately 7 mm of rightward midline shift.   No evidence   of transtentorial herniation. 2. No acute fracture or subluxation within the cervical spine. 3. 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 Findings discussed with Natali Barroso via telephone on 01/13/2023 at 1610   hours [facility] Standard time. CT CHEST W CONTRAST    (Results Pending)   CT ABDOMEN PELVIS W IV CONTRAST Additional Contrast? None    (Results Pending)         EKG Interpretation  Interpreted by emergency department physician, Dr. John Jarquin             ------------------------- NURSING NOTES AND VITALS REVIEWED ---------------------------   The nursing notes within the ED encounter and vital signs as below have been reviewed by myself  BP (!) 144/79   Pulse 77   Temp 97.8 °F (36.6 °C)   Resp 18   Ht 5' 10\" (1.778 m)   Wt 172 lb (78 kg)   SpO2 98%   BMI 24.68 kg/m²     Oxygen Saturation Interpretation: Normal    The patients available past medical records and past encounters were reviewed.         ------------------------------ ED COURSE/MEDICAL DECISION MAKING----------------------  Medications   sodium chloride flush 0.9 % injection 5-40 mL (has no administration in time range)   sodium chloride flush 0.9 % injection 5-40 mL (has no administration in time range)   0.9 % sodium chloride infusion (has no administration in time range)   acetaminophen (TYLENOL) tablet 650 mg (has no administration in time range)   oxyCODONE (ROXICODONE) immediate release tablet 5 mg (has no administration in time range)     Or   oxyCODONE HCl (OXY-IR) immediate release tablet 10 mg (has no administration in time range)   ondansetron (ZOFRAN-ODT) disintegrating tablet 4 mg (has no administration in time range)     Or   ondansetron (ZOFRAN) injection 4 mg (has no administration in time range)   polyethylene glycol (GLYCOLAX) packet 17 g (has no administration in time range)   levETIRAcetam (KEPPRA) tablet 500 mg (has no administration in time range)   levETIRAcetam (KEPPRA) 500 mg/100 mL IVPB (500 mg IntraVENous New Bag 1/13/23 7686)           The cardiac monitor revealed sinus with a heart rate in the 80s as interpreted by me. The cardiac monitor was ordered secondary to the patient's fall and to monitor the patient for dysrhythmia. CPT [id number]         Medical Decision Making:      Patient presents after mechanical fall and hitting his head. Concern for skull fracture, intracranial hemorrhage, and concussion. Other pathologies also considered. He had no symptoms or complaints other than a headache. Required no medications. Head CT interpreted myself shows a large subacute on chronic subdural hematoma with shift. Radiologist agrees. Cervical spine CT shows no acute findings. Labs interpreted by myself shows a creatinine of 1.6, chart reviewed, this is consistent with previous studies. Reevaluation, patient's resting comfortably. Remains hemodynamically stable. Discussed with neurosurgery, Dr. Evelyn Samuel, he will be on consult. Patient was loaded with IV Keppra. Discussed with the trauma service, patient will be admitted to the surgical ICU after trauma evaluation. Please note that the withdrawal or failure to initiate urgent interventions for this patient would likely result in a life threatening deterioration or permanent disability. Accordingly this patient received 30 minutes of critical care time, excluding separately billable procedures. Counseling: The emergency provider has spoken with the patient and discussed todays results, in addition to providing specific details for the plan of care and counseling regarding the diagnosis and prognosis.   Questions are answered at this time and they are agreeable with the plan.       --------------------------------- IMPRESSION AND DISPOSITION ---------------------------------    IMPRESSION  1. SDH (subdural hematoma) DISPOSITION  Disposition: Admit to CCU/ICU  Patient condition is serious        NOTE: This report was transcribed using voice recognition software.  Every effort was made to ensure accuracy; however, inadvertent computerized transcription errors may be present        Shanti Loja MD  01/16/23 [id number]

## 2023-01-14 ENCOUNTER — ANESTHESIA (OUTPATIENT)
Dept: OPERATING ROOM | Age: 78
End: 2023-01-14
Payer: MEDICARE

## 2023-01-14 LAB
ABO/RH: NORMAL
ALBUMIN SERPL-MCNC: 3.8 G/DL (ref 3.5–5.2)
ALP BLD-CCNC: 73 U/L (ref 40–129)
ALT SERPL-CCNC: 14 U/L (ref 0–40)
ANION GAP SERPL CALCULATED.3IONS-SCNC: 13 MMOL/L (ref 7–16)
ANTIBODY SCREEN: NORMAL
AST SERPL-CCNC: 17 U/L (ref 0–39)
BASOPHILS ABSOLUTE: 0.05 E9/L (ref 0–0.2)
BASOPHILS RELATIVE PERCENT: 0.6 % (ref 0–2)
BILIRUB SERPL-MCNC: 0.4 MG/DL (ref 0–1.2)
BUN BLDV-MCNC: 22 MG/DL (ref 6–23)
CALCIUM IONIZED: 1.34 MMOL/L (ref 1.15–1.33)
CALCIUM SERPL-MCNC: 9.3 MG/DL (ref 8.6–10.2)
CHLORIDE BLD-SCNC: 107 MMOL/L (ref 98–107)
CO2: 21 MMOL/L (ref 22–29)
CREAT SERPL-MCNC: 1.4 MG/DL (ref 0.7–1.2)
EOSINOPHILS ABSOLUTE: 0.12 E9/L (ref 0.05–0.5)
EOSINOPHILS RELATIVE PERCENT: 1.4 % (ref 0–6)
GFR SERPL CREATININE-BSD FRML MDRD: 52 ML/MIN/1.73
GLUCOSE BLD-MCNC: 107 MG/DL (ref 74–99)
HCT VFR BLD CALC: 41.1 % (ref 37–54)
HEMOGLOBIN: 14 G/DL (ref 12.5–16.5)
IMMATURE GRANULOCYTES #: 0.03 E9/L
IMMATURE GRANULOCYTES %: 0.3 % (ref 0–5)
LYMPHOCYTES ABSOLUTE: 1.1 E9/L (ref 1.5–4)
LYMPHOCYTES RELATIVE PERCENT: 12.7 % (ref 20–42)
MAGNESIUM: 2.2 MG/DL (ref 1.6–2.6)
MCH RBC QN AUTO: 30.2 PG (ref 26–35)
MCHC RBC AUTO-ENTMCNC: 34.1 % (ref 32–34.5)
MCV RBC AUTO: 88.8 FL (ref 80–99.9)
MONOCYTES ABSOLUTE: 0.7 E9/L (ref 0.1–0.95)
MONOCYTES RELATIVE PERCENT: 8.1 % (ref 2–12)
NEUTROPHILS ABSOLUTE: 6.68 E9/L (ref 1.8–7.3)
NEUTROPHILS RELATIVE PERCENT: 76.9 % (ref 43–80)
PDW BLD-RTO: 13.6 FL (ref 11.5–15)
PHOSPHORUS: 2.9 MG/DL (ref 2.5–4.5)
PLATELET # BLD: 173 E9/L (ref 130–450)
PMV BLD AUTO: 9.8 FL (ref 7–12)
POTASSIUM SERPL-SCNC: 4 MMOL/L (ref 3.5–5)
RBC # BLD: 4.63 E12/L (ref 3.8–5.8)
SODIUM BLD-SCNC: 141 MMOL/L (ref 132–146)
TOTAL PROTEIN: 7.2 G/DL (ref 6.4–8.3)
WBC # BLD: 8.7 E9/L (ref 4.5–11.5)

## 2023-01-14 PROCEDURE — 80053 COMPREHEN METABOLIC PANEL: CPT

## 2023-01-14 PROCEDURE — 82330 ASSAY OF CALCIUM: CPT

## 2023-01-14 PROCEDURE — 2580000003 HC RX 258: Performed by: NEUROLOGICAL SURGERY

## 2023-01-14 PROCEDURE — 2720000010 HC SURG SUPPLY STERILE: Performed by: NEUROLOGICAL SURGERY

## 2023-01-14 PROCEDURE — 3700000001 HC ADD 15 MINUTES (ANESTHESIA): Performed by: NEUROLOGICAL SURGERY

## 2023-01-14 PROCEDURE — 3600000015 HC SURGERY LEVEL 5 ADDTL 15MIN: Performed by: NEUROLOGICAL SURGERY

## 2023-01-14 PROCEDURE — 99291 CRITICAL CARE FIRST HOUR: CPT | Performed by: SURGERY

## 2023-01-14 PROCEDURE — 2709999900 HC NON-CHARGEABLE SUPPLY: Performed by: NEUROLOGICAL SURGERY

## 2023-01-14 PROCEDURE — 86900 BLOOD TYPING SEROLOGIC ABO: CPT

## 2023-01-14 PROCEDURE — 2580000003 HC RX 258: Performed by: NURSE ANESTHETIST, CERTIFIED REGISTERED

## 2023-01-14 PROCEDURE — 83735 ASSAY OF MAGNESIUM: CPT

## 2023-01-14 PROCEDURE — 85025 COMPLETE CBC W/AUTO DIFF WBC: CPT

## 2023-01-14 PROCEDURE — 6370000000 HC RX 637 (ALT 250 FOR IP): Performed by: STUDENT IN AN ORGANIZED HEALTH CARE EDUCATION/TRAINING PROGRAM

## 2023-01-14 PROCEDURE — 6370000000 HC RX 637 (ALT 250 FOR IP): Performed by: NEUROLOGICAL SURGERY

## 2023-01-14 PROCEDURE — 84100 ASSAY OF PHOSPHORUS: CPT

## 2023-01-14 PROCEDURE — 6360000002 HC RX W HCPCS

## 2023-01-14 PROCEDURE — 6360000002 HC RX W HCPCS: Performed by: NEUROLOGICAL SURGERY

## 2023-01-14 PROCEDURE — 3700000000 HC ANESTHESIA ATTENDED CARE: Performed by: NEUROLOGICAL SURGERY

## 2023-01-14 PROCEDURE — 86901 BLOOD TYPING SEROLOGIC RH(D): CPT

## 2023-01-14 PROCEDURE — 7100000000 HC PACU RECOVERY - FIRST 15 MIN

## 2023-01-14 PROCEDURE — 6360000002 HC RX W HCPCS: Performed by: STUDENT IN AN ORGANIZED HEALTH CARE EDUCATION/TRAINING PROGRAM

## 2023-01-14 PROCEDURE — 2580000003 HC RX 258: Performed by: STUDENT IN AN ORGANIZED HEALTH CARE EDUCATION/TRAINING PROGRAM

## 2023-01-14 PROCEDURE — 7100000001 HC PACU RECOVERY - ADDTL 15 MIN

## 2023-01-14 PROCEDURE — 2500000003 HC RX 250 WO HCPCS: Performed by: NURSE ANESTHETIST, CERTIFIED REGISTERED

## 2023-01-14 PROCEDURE — C1729 CATH, DRAINAGE: HCPCS | Performed by: NEUROLOGICAL SURGERY

## 2023-01-14 PROCEDURE — C1713 ANCHOR/SCREW BN/BN,TIS/BN: HCPCS | Performed by: NEUROLOGICAL SURGERY

## 2023-01-14 PROCEDURE — 6360000002 HC RX W HCPCS: Performed by: NURSE ANESTHETIST, CERTIFIED REGISTERED

## 2023-01-14 PROCEDURE — 2000000000 HC ICU R&B

## 2023-01-14 PROCEDURE — 86850 RBC ANTIBODY SCREEN: CPT

## 2023-01-14 PROCEDURE — 3600000005 HC SURGERY LEVEL 5 BASE: Performed by: NEUROLOGICAL SURGERY

## 2023-01-14 PROCEDURE — 36415 COLL VENOUS BLD VENIPUNCTURE: CPT

## 2023-01-14 PROCEDURE — 2500000003 HC RX 250 WO HCPCS: Performed by: NEUROLOGICAL SURGERY

## 2023-01-14 DEVICE — SCREW, AXS, SELF-TAPPING
Type: IMPLANTABLE DEVICE | Site: SKULL | Status: FUNCTIONAL
Brand: UNIVERSAL NEURO 3

## 2023-01-14 DEVICE — LOW PROFILE BURR HOLE COVER, W/TAB, 10MM
Type: IMPLANTABLE DEVICE | Site: SKULL | Status: FUNCTIONAL
Brand: UNIVERSAL NEURO 2

## 2023-01-14 RX ORDER — CEFAZOLIN SODIUM 1 G/3ML
INJECTION, POWDER, FOR SOLUTION INTRAMUSCULAR; INTRAVENOUS PRN
Status: DISCONTINUED | OUTPATIENT
Start: 2023-01-14 | End: 2023-01-14 | Stop reason: SDUPTHER

## 2023-01-14 RX ORDER — EPHEDRINE SULFATE/0.9% NACL/PF 50 MG/5 ML
SYRINGE (ML) INTRAVENOUS PRN
Status: DISCONTINUED | OUTPATIENT
Start: 2023-01-14 | End: 2023-01-14 | Stop reason: SDUPTHER

## 2023-01-14 RX ORDER — LIDOCAINE HYDROCHLORIDE 20 MG/ML
INJECTION, SOLUTION INTRAVENOUS PRN
Status: DISCONTINUED | OUTPATIENT
Start: 2023-01-14 | End: 2023-01-14 | Stop reason: SDUPTHER

## 2023-01-14 RX ORDER — SERTRALINE HYDROCHLORIDE 25 MG/1
25 TABLET, FILM COATED ORAL DAILY
Status: DISCONTINUED | OUTPATIENT
Start: 2023-01-15 | End: 2023-01-18 | Stop reason: HOSPADM

## 2023-01-14 RX ORDER — LIDOCAINE HYDROCHLORIDE AND EPINEPHRINE 10; 10 MG/ML; UG/ML
INJECTION, SOLUTION INFILTRATION; PERINEURAL PRN
Status: DISCONTINUED | OUTPATIENT
Start: 2023-01-14 | End: 2023-01-14 | Stop reason: ALTCHOICE

## 2023-01-14 RX ORDER — SODIUM CHLORIDE 9 MG/ML
INJECTION, SOLUTION INTRAVENOUS CONTINUOUS PRN
Status: DISCONTINUED | OUTPATIENT
Start: 2023-01-14 | End: 2023-01-14 | Stop reason: SDUPTHER

## 2023-01-14 RX ORDER — ESMOLOL HYDROCHLORIDE 10 MG/ML
INJECTION INTRAVENOUS PRN
Status: DISCONTINUED | OUTPATIENT
Start: 2023-01-14 | End: 2023-01-14 | Stop reason: SDUPTHER

## 2023-01-14 RX ORDER — LEVOTHYROXINE SODIUM 0.03 MG/1
25 TABLET ORAL DAILY
Status: DISCONTINUED | OUTPATIENT
Start: 2023-01-15 | End: 2023-01-18 | Stop reason: HOSPADM

## 2023-01-14 RX ORDER — ROCURONIUM BROMIDE 10 MG/ML
INJECTION, SOLUTION INTRAVENOUS PRN
Status: DISCONTINUED | OUTPATIENT
Start: 2023-01-14 | End: 2023-01-14 | Stop reason: SDUPTHER

## 2023-01-14 RX ORDER — CEFAZOLIN SODIUM 1 G/3ML
INJECTION, POWDER, FOR SOLUTION INTRAMUSCULAR; INTRAVENOUS PRN
Status: DISCONTINUED | OUTPATIENT
Start: 2023-01-14 | End: 2023-01-14

## 2023-01-14 RX ORDER — FENTANYL CITRATE 50 UG/ML
INJECTION, SOLUTION INTRAMUSCULAR; INTRAVENOUS PRN
Status: DISCONTINUED | OUTPATIENT
Start: 2023-01-14 | End: 2023-01-14 | Stop reason: SDUPTHER

## 2023-01-14 RX ORDER — PROPOFOL 10 MG/ML
INJECTION, EMULSION INTRAVENOUS PRN
Status: DISCONTINUED | OUTPATIENT
Start: 2023-01-14 | End: 2023-01-14 | Stop reason: SDUPTHER

## 2023-01-14 RX ORDER — BACITRACIN ZINC 500 [USP'U]/G
OINTMENT TOPICAL PRN
Status: DISCONTINUED | OUTPATIENT
Start: 2023-01-14 | End: 2023-01-14 | Stop reason: ALTCHOICE

## 2023-01-14 RX ORDER — DEXAMETHASONE SODIUM PHOSPHATE 10 MG/ML
INJECTION INTRAMUSCULAR; INTRAVENOUS PRN
Status: DISCONTINUED | OUTPATIENT
Start: 2023-01-14 | End: 2023-01-14 | Stop reason: SDUPTHER

## 2023-01-14 RX ORDER — ONDANSETRON 2 MG/ML
INJECTION INTRAMUSCULAR; INTRAVENOUS PRN
Status: DISCONTINUED | OUTPATIENT
Start: 2023-01-14 | End: 2023-01-14 | Stop reason: SDUPTHER

## 2023-01-14 RX ADMIN — DEXAMETHASONE SODIUM PHOSPHATE 10 MG: 10 INJECTION INTRAMUSCULAR; INTRAVENOUS at 08:02

## 2023-01-14 RX ADMIN — LEVETIRACETAM 500 MG: 500 TABLET, FILM COATED ORAL at 21:07

## 2023-01-14 RX ADMIN — SUGAMMADEX 313 MG: 100 INJECTION, SOLUTION INTRAVENOUS at 09:01

## 2023-01-14 RX ADMIN — Medication 5 MG: at 08:46

## 2023-01-14 RX ADMIN — METOPROLOL TARTRATE 50 MG: 50 TABLET, FILM COATED ORAL at 10:28

## 2023-01-14 RX ADMIN — LEVETIRACETAM 500 MG: 500 TABLET, FILM COATED ORAL at 05:30

## 2023-01-14 RX ADMIN — HYDRALAZINE HYDROCHLORIDE 10 MG: 20 INJECTION INTRAMUSCULAR; INTRAVENOUS at 00:10

## 2023-01-14 RX ADMIN — LIDOCAINE HYDROCHLORIDE 100 MG: 20 INJECTION, SOLUTION INTRAVENOUS at 07:56

## 2023-01-14 RX ADMIN — ONDANSETRON 4 MG: 2 INJECTION INTRAMUSCULAR; INTRAVENOUS at 08:58

## 2023-01-14 RX ADMIN — PHENYLEPHRINE HYDROCHLORIDE 100 MCG: 10 INJECTION INTRAVENOUS at 08:09

## 2023-01-14 RX ADMIN — SODIUM CHLORIDE, PRESERVATIVE FREE 10 ML: 5 INJECTION INTRAVENOUS at 21:08

## 2023-01-14 RX ADMIN — ROCURONIUM BROMIDE 50 MG: 10 INJECTION, SOLUTION INTRAVENOUS at 07:56

## 2023-01-14 RX ADMIN — Medication 5 MG: at 08:53

## 2023-01-14 RX ADMIN — WATER 2000 MG: 1 INJECTION INTRAMUSCULAR; INTRAVENOUS; SUBCUTANEOUS at 15:37

## 2023-01-14 RX ADMIN — HYDRALAZINE HYDROCHLORIDE 10 MG: 20 INJECTION INTRAMUSCULAR; INTRAVENOUS at 02:10

## 2023-01-14 RX ADMIN — PRAVASTATIN SODIUM 80 MG: 20 TABLET ORAL at 21:07

## 2023-01-14 RX ADMIN — ACETAMINOPHEN 650 MG: 325 TABLET ORAL at 15:43

## 2023-01-14 RX ADMIN — METOPROLOL TARTRATE 50 MG: 50 TABLET, FILM COATED ORAL at 21:07

## 2023-01-14 RX ADMIN — PHENYLEPHRINE HYDROCHLORIDE 200 MCG: 10 INJECTION INTRAVENOUS at 08:38

## 2023-01-14 RX ADMIN — SODIUM CHLORIDE, PRESERVATIVE FREE 10 ML: 5 INJECTION INTRAVENOUS at 10:29

## 2023-01-14 RX ADMIN — PROPOFOL 100 MG: 10 INJECTION, EMULSION INTRAVENOUS at 07:56

## 2023-01-14 RX ADMIN — SODIUM CHLORIDE: 9 INJECTION, SOLUTION INTRAVENOUS at 07:40

## 2023-01-14 RX ADMIN — ESMOLOL HYDROCHLORIDE 30 MG: 10 INJECTION, SOLUTION INTRAVENOUS at 09:04

## 2023-01-14 RX ADMIN — CEFAZOLIN 2 G: 1 INJECTION, POWDER, FOR SOLUTION INTRAMUSCULAR; INTRAVENOUS at 08:13

## 2023-01-14 RX ADMIN — FENTANYL CITRATE 50 MCG: 50 INJECTION, SOLUTION INTRAMUSCULAR; INTRAVENOUS at 07:56

## 2023-01-14 RX ADMIN — AMLODIPINE BESYLATE 10 MG: 10 TABLET ORAL at 10:28

## 2023-01-14 RX ADMIN — PHENYLEPHRINE HYDROCHLORIDE 100 MCG: 10 INJECTION INTRAVENOUS at 08:00

## 2023-01-14 RX ADMIN — ONDANSETRON 4 MG: 2 INJECTION INTRAMUSCULAR; INTRAVENOUS at 03:50

## 2023-01-14 ASSESSMENT — PAIN DESCRIPTION - FREQUENCY: FREQUENCY: CONTINUOUS

## 2023-01-14 ASSESSMENT — PAIN - FUNCTIONAL ASSESSMENT
PAIN_FUNCTIONAL_ASSESSMENT: ACTIVITIES ARE NOT PREVENTED
PAIN_FUNCTIONAL_ASSESSMENT: ACTIVITIES ARE NOT PREVENTED

## 2023-01-14 ASSESSMENT — PAIN SCALES - GENERAL
PAINLEVEL_OUTOF10: 0
PAINLEVEL_OUTOF10: 4
PAINLEVEL_OUTOF10: 0
PAINLEVEL_OUTOF10: 2
PAINLEVEL_OUTOF10: 0

## 2023-01-14 ASSESSMENT — PAIN DESCRIPTION - LOCATION
LOCATION: HEAD
LOCATION: HEAD;INCISION

## 2023-01-14 ASSESSMENT — PAIN DESCRIPTION - ONSET: ONSET: ON-GOING

## 2023-01-14 ASSESSMENT — PAIN DESCRIPTION - DESCRIPTORS
DESCRIPTORS: ACHING;DISCOMFORT;SORE
DESCRIPTORS: OTHER (COMMENT)

## 2023-01-14 ASSESSMENT — PAIN DESCRIPTION - ORIENTATION
ORIENTATION: LEFT
ORIENTATION: MID;RIGHT;LEFT

## 2023-01-14 ASSESSMENT — PAIN DESCRIPTION - PAIN TYPE: TYPE: ACUTE PAIN

## 2023-01-14 NOTE — ANESTHESIA POSTPROCEDURE EVALUATION
Department of Anesthesiology  Postprocedure Note    Patient: Maru Gonzalez  MRN: [id number]  YOB: 1945  Date of evaluation: 1/14/2023      Procedure Summary     Date: 01/14/23 Room / Location: [facility] OR 07 / [facility]    Anesthesia Start: 7725 Anesthesia Stop: 0915    Procedure: LEFT PARIETAL ZHEN HOLE FOR SUBDURAL DRAIN (Left: Head) Diagnosis:       Chronic subdural hematoma (HCC)      (Chronic subdural hematoma ([address] [I62.03])    Surgeons: Cecilia Sumner MD Responsible Provider: Giles Paniagua MD    Anesthesia Type: general ASA Status: 3          Anesthesia Type: No value filed.     Oscar Phase I:      Oscar Phase II:        Anesthesia Post Evaluation    Patient location during evaluation: ICU  Patient participation: complete - patient participated  Level of consciousness: awake  Pain score: 3  Airway patency: patent  Nausea & Vomiting: no vomiting  Complications: no  Cardiovascular status: hemodynamically stable  Respiratory status: spontaneous ventilation  Hydration status: stable

## 2023-01-14 NOTE — ANESTHESIA PRE PROCEDURE
Department of Anesthesiology  Preprocedure Note       Name:  Mara Powers   Age:  69 y.o.  :  1945                                          MRN:  [id number]         Date:  2023      Surgeon: Melania Maria):  Praveen Bui MD    Procedure: Procedure(s):  RIGHT PARIETAL ZHEN HOLE FOR SUBDURAL DRAIN- FULL LATERAL WITH BEANBAG, RIGHT SIDE UP WANTS 07:30    Medications prior to admission:   Prior to Admission medications    Medication Sig Start Date End Date Taking? Authorizing Provider   rosuvastatin (CRESTOR) 20 MG tablet Take 20 mg by mouth daily   Yes Historical Provider, MD   sertraline (ZOLOFT) 25 MG tablet Take 25 mg by mouth daily    Historical Provider, MD   levothyroxine (SYNTHROID) 25 MCG tablet Take 25 mcg by mouth Daily    Historical Provider, MD   metoprolol (LOPRESSOR) 50 MG tablet Take 50 mg by mouth 2 times daily. Historical Provider, MD   amlodipine (NORVASC) 10 MG tablet Take 10 mg by mouth daily. Historical Provider, MD   aspirin 81 MG chewable tablet Take 81 mg by mouth daily. Historical Provider, MD   vitamin D (CHOLECALCIFEROL) 50 MCG (2000) TABS tablet Take 2,000 Units by mouth daily.     Historical Provider, MD       Current medications:    Current Facility-Administered Medications   Medication Dose Route Frequency Provider Last Rate Last Admin    sodium chloride flush 0.9 % injection 5-40 mL  5-40 mL IntraVENous 2 times per day Terry Crome, DO   10 mL at 23    sodium chloride flush 0.9 % injection 5-40 mL  5-40 mL IntraVENous PRN Terry Crome, DO        0.9 % sodium chloride infusion  25 mL IntraVENous PRN Prescott Crome, DO        acetaminophen (TYLENOL) tablet 650 mg  650 mg Oral Q6H PRN Terry Crome, DO        oxyCODONE (ROXICODONE) immediate release tablet 5 mg  5 mg Oral Q4H PRN Prescott Crome, DO        Or    oxyCODONE HCl (OXY-IR) immediate release tablet 10 mg  10 mg Oral Q4H PRN Terry Crome, DO        ondansetron (ZOFRAN-ODT) disintegrating tablet 4 mg  4 mg Oral Q8H PRN Alyne Musty, DO        Or    [facility]ron [facility]) injection 4 mg  4 mg IntraVENous Q6H PRN Alyne Musty, DO        polyethylene glycol (GLYCOLAX) packet 17 g  17 g Oral BID Alyne Musty, DO   17 g at 01/13/23 2030    [START ON 1/14/2023] levETIRAcetam (KEPPRA) tablet 500 mg  500 mg Oral BID Alyne Musty, DO        labetalol (NORMODYNE;TRANDATE) injection 10 mg  10 mg IntraVENous Q30 Min PRN Alyne Musty, DO        hydrALAZINE (APRESOLINE) injection 10 mg  10 mg IntraVENous Q30 Min PRN Alyne Musty, DO        [START ON 1/14/2023] amLODIPine (NORVASC) tablet 10 mg  10 mg Oral Daily Jovanny Sauder, DO        metoprolol tartrate (LOPRESSOR) tablet 50 mg  50 mg Oral BID Jovanny Sauder, DO   50 mg at 01/13/23 2030    pravastatin (PRAVACHOL) tablet 80 mg  80 mg Oral Nightly Jovanny Sauder, DO   80 mg at 01/13/23 2034       Allergies:  No Known Allergies    Problem List:    Patient Active Problem List   Diagnosis Code    S/P AAA (abdominal aortic aneurysm) repair Z98.890, Z86.79    Prominent abdominal aortic pulse R09.89    History of tobacco use Z87.891    SDH (subdural hematoma) S06. 5XAA       Past Medical History:        Diagnosis Date    Abdominal aortic aneurysm     CAD (coronary artery disease)     Cancer ([facility] [address]     bladder s/p resection 1994    History of heart attack 2004    Hyperlipidemia     Hypertension     Hypothyroidism     Prominent abdominal aortic pulse 7/22/2021    S/P AAA (abdominal aortic aneurysm) repair 4/19/2012       Past Surgical History:        Procedure Laterality Date    ABDOMINAL AORTIC ANEURYSM REPAIR, OPEN  2007    AAA repair and aorto-iliac bypass   [address] [place]    resection for carcinoma   Delbert Romero, [place]    HERNIA REPAIR         Social History:    Social History     Tobacco Use    Smoking status: Former     Packs/day: 2.00     Types: Cigarettes     Quit date: 2005     Years since quittin.7    Smokeless tobacco: Never   Substance Use Topics    Alcohol use: Yes     Comment: rare                                Counseling given: Not Answered      Vital Signs (Current):   Vitals:    23 1442 23 1500 23   BP:  (!) 144/79 (!) 180/87   Pulse: 77  73   Resp:  18 22   Temp: 36.6 °C (97.8 °F)  36.6 °C (97.8 °F)   TempSrc:   Axillary   SpO2: 98%     Weight:  172 lb (78 kg)    Height:  5' 10\" (1.778 m)                                               BP Readings from Last 3 Encounters:   23 (!) 180/87       NPO Status:                           NPO  @ 0000                                                      BMI:   Wt Readings from Last 3 Encounters:   23 172 lb (78 kg)   21 175 lb (79.4 kg)     Body mass index is 24.68 kg/m². CBC:   Lab Results   Component Value Date/Time    WBC 6.8 2023 04:32 PM    RBC 4.45 2023 04:32 PM    HGB 13.7 2023 04:32 PM    HCT 41.9 2023 04:32 PM    MCV 94.2 2023 04:32 PM    RDW 13.3 2023 04:32 PM     2023 04:32 PM       CMP:   Lab Results   Component Value Date/Time     2023 04:32 PM    K 3.8 2023 04:32 PM     2023 04:32 PM    CO2 20 2023 04:32 PM    BUN 26 2023 04:32 PM    CREATININE 1.6 2023 04:32 PM    GFRAA 45 2022 04:00 AM    LABGLOM 44 2023 04:32 PM    GLUCOSE 159 2023 04:32 PM    PROT 6.9 2023 04:32 PM    CALCIUM 9.1 2023 04:32 PM    BILITOT 0.3 2023 04:32 PM    ALKPHOS 70 2023 04:32 PM    AST 14 2023 04:32 PM    ALT 15 2023 04:32 PM       POC Tests: No results for input(s): POCGLU, POCNA, POCK, POCCL, POCBUN, POCHEMO, POCHCT in the last 72 hours.     Coags:   Lab Results   Component Value Date/Time    PROTIME 12.6 2023 04:32 PM    INR 1.2 2023 04:32 PM APTT 30.8 01/13/2023 04:32 PM       HCG (If Applicable): No results found for: PREGTESTUR, PREGSERUM, HCG, HCGQUANT     ABGs: No results found for: PHART, PO2ART, RZA8YOL, THG2OMZ, BEART, Z0OSOHZG     Type & Screen (If Applicable):  No results found for: LABABO, LABRH    Drug/Infectious Status (If Applicable):  No results found for: HIV, HEPCAB    COVID-19 Screening (If Applicable): No results found for: COVID19        Anesthesia Evaluation  Patient summary reviewed and Nursing notes reviewed no history of anesthetic complications:   Airway: Mallampati: I  TM distance: >3 FB   Neck ROM: full  Mouth opening: > = 3 FB   Dental:          Pulmonary: breath sounds clear to auscultation                            ROS comment: HX smoking- quit 2005   Cardiovascular:  Exercise tolerance: good (>4 METS),   (+) hypertension: mild, CAD:, CABG/stent (has \"one stent\"):,         Rhythm: regular  Rate: normal           Beta Blocker:  Dose within 24 Hrs      ROS comment: HX AAA open repair 2007  MI     Neuro/Psych:                ROS comment: SDH GI/Hepatic/Renal:             Endo/Other:    (+) hypothyroidism::., .                 Abdominal:       Abdomen: soft. Vascular: Other Findings:           Anesthesia Plan      general     ASA 3       Induction: intravenous. MIPS: Postoperative opioids intended and Prophylactic antiemetics administered. Anesthetic plan and risks discussed with patient, spouse and child/children. Use of blood products discussed with patient whom consented to blood products. Plan discussed with CRNA and attending. Lisseth Harmon RN   1/13/2023      Pt seen, examined, chart reviewed, plan discussed. Maria Antonia Dutta MD  1/14/2023  6:12 AM        DOS STAFF ADDENDUM:    Pt seen and examined, physical exam updated, chart reviewed including anesthesia, drug and allergy history. H&P reviewed.   No interval changes to history or physical examination (unless noted above). NPO status confirmed. Anesthetic plan, risks, benefits, alternatives discussed with patient, his wife, and his children. Patient. His wife, and his children verbalized an understanding and agrees to proceed. Patient is a DNR- CCA, but is agreeable to intubation and medications for anesthesia, but does not want chest compressions or defibrillation.       Castillo Pedraza MD   Anesthesiologist

## 2023-01-14 NOTE — OP NOTE
[address]                  [address] [id number],  [facility]                                OPERATIVE REPORT    PATIENT NAME: Rajendra Connell                    :        1945  MED REC NO:   [id number]                            ROOM:       6811  ACCOUNT NO:   [de-identified]                           ADMIT DATE: 2023  PROVIDER:     Jennifer Vogel MD    DATE OF PROCEDURE:  2023    PREOPERATIVE DIAGNOSIS:  Large left subacute subdural hematoma. POSTOPERATIVE DIAGNOSIS:  Large left subacute subdural hematoma. PROCEDURE PERFORMED:  Left posterior frontal taz hole for drainage of  subdural hematoma, placement of subdural drain. ATTENDING SURGEON:  Jennifer Vogel MD    ANESTHESIA:  General endotracheal.    INDICATION:  This gentleman presented with altered consciousness and  right-sided headache. CT of the head documented bilateral subacute  subdural hematomas, the left quite large, the right very small, so he  was taken to the OR for taz hole evacuation of the left-sided subacute  subdural hematoma. DESCRIPTION OF THE PROCEDURE:  Following induction of general  endotracheal anesthesia, he was turned to right lateral decubitus  position with an axillary roll on a beanbag. Arms and legs padded as  necessary. Neck maintained in neutral spine position. The left  frontoparietal region shaved, prepped, and draped per routine fashion. A 1-inch linear incision was made in the coronal plane over the  posterior left frontal boss. Self-retaining retractor placed in the  wound. Jessi Eladia hole placed in a standard fashion. Dura was opened,  irrigated out until clear. Drain placed into the subdural space,  tunneled posteriorly, connected to a sterile closed drainage system. Jessi Eladia hole cover placed over the taz hole. He tolerated procedure well. No apparent complications. Drain was functioning nicely when he was  taken back to ICU.  Estimated blood loss 200 mL. Sponge and needle  counts correct at the end of the case.         Alicia Saravia MD    D: 01/14/2023 10:08:53       T: 01/14/2023 10:12:19     [email or web address]  Job#: [id number]     Doc#: [id number]    CC:

## 2023-01-14 NOTE — CONSULTS
Neurosurgery Consult Note      CHIEF COMPLAINT: Bilateral subdural hematomas  HPI: Patient presented after multiple falls and headache imaging documented bilateral left much larger than right subdural hematomas that were subacute he had 7 mm of shift of midline structures from left to right history and physical was reviewed patient and family counseled extensively    Past Medical History:   Diagnosis Date    Abdominal aortic aneurysm     CAD (coronary artery disease)     Cancer ([facility] [address]     bladder s/p resection 1994    History of heart attack 2004    Hyperlipidemia     Hypertension     Hypothyroidism     Prominent abdominal aortic pulse 7/22/2021    S/P AAA (abdominal aortic aneurysm) repair 4/19/2012     Past Surgical History:   Procedure Laterality Date    ABDOMINAL AORTIC ANEURYSM REPAIR, OPEN  2007    AAA repair and aorto-iliac bypass    BLADDER SURGERY  1994    resection for Melinda Belt       [facility], [address]       Patient has no known allergies. Prior to Admission medications    Medication Sig Start Date End Date Taking? Authorizing Provider   rosuvastatin (CRESTOR) 20 MG tablet Take 20 mg by mouth daily   Yes Historical Provider, MD   sertraline (ZOLOFT) 25 MG tablet Take 25 mg by mouth daily    Historical Provider, MD   levothyroxine (SYNTHROID) 25 MCG tablet Take 25 mcg by mouth Daily    Historical Provider, MD   metoprolol (LOPRESSOR) 50 MG tablet Take 50 mg by mouth 2 times daily. Historical Provider, MD   amlodipine (NORVASC) 10 MG tablet Take 10 mg by mouth daily. Historical Provider, MD   aspirin 81 MG chewable tablet Take 81 mg by mouth daily. Historical Provider, MD   vitamin D (CHOLECALCIFEROL) 50 MCG (2000 UT) TABS tablet Take 2,000 Units by mouth daily.     Historical Provider, MD     Outpatient Medications Marked as Taking for the 1/13/23 encounter [facility] Encounter)   Medication Sig Dispense Refill    rosuvastatin (CRESTOR) 20 MG tablet Take 20 mg by mouth daily       Social History     Socioeconomic History    Marital status:      Spouse name: Not on file    Number of children: Not on file    Years of education: Not on file    Highest education level: Not on file   Occupational History    Not on file   Tobacco Use    Smoking status: Former     Packs/day: 2.00     Types: Cigarettes     Quit date: 2005     Years since quittin.7    Smokeless tobacco: Never   Vaping Use    Vaping Use: Never used   Substance and Sexual Activity    Alcohol use: Yes     Comment: rare    Drug use: Never    Sexual activity: Not on file   Other Topics Concern    Not on file   Social History Narrative    Not on file     Social Determinants of Health     Financial Resource Strain: Not on file   Food Insecurity: Not on file   Transportation Needs: Not on file   Physical Activity: Not on file   Stress: Not on file   Social Connections: Not on file   Intimate Partner Violence: Not on file   Housing Stability: Not on file     History reviewed. No pertinent family history. ROS: Complete 10 point ROS was obtained with positives in HPI, otherwise:  Pt denies fevers, denies chills. Pt denies chest pain, denies SOB, denies nausea, denies vomiting, denies headache.       VITALS/DRAINS:   VITALS:  /78   Pulse 83   Temp 98.4 °F (36.9 °C) (Oral)   Resp 24   Ht 5' 10\" (1.778 m)   Wt 172 lb 11.2 oz (78.3 kg)   SpO2 99%   BMI 24.78 kg/m²   24HR INTAKE/OUTPUT:    Intake/Output Summary (Last 24 hours) at 2023 1339  Last data filed at 2023 0913  Gross per 24 hour   Intake 940 ml   Output 437 ml   Net 503 ml       EXAMINATION: Patient laying supine in bed in the ICU head of bed is elevated he is awake and alert no acute distress friendly cooperative oriented x3  Cranial Nerves: Pupils equal round reactive extraocular is full visual fields full by confrontation no motor facial weakness  Motor: Normal bulk and tone no focal weakness  Sensory: Intact to light touch position  Cerebellar: Back rapid alternating movements intact  Gait: Not checked  DTRs: +1 equal bilaterally    IMAGING STUDIES:  CT HEAD WO CONTRAST    Result Date: 1/13/2023  EXAMINATION: CT OF THE HEAD WITHOUT CONTRAST; CT OF THE CERVICAL SPINE WITHOUT CONTRAST 1/13/2023 3:23 pm TECHNIQUE: CT of the head was performed without the administration of intravenous contrast. Automated exposure control, iterative reconstruction, and/or weight based adjustment of the mA/kV was utilized to reduce the radiation dose to as low as reasonably achievable.; CT of the cervical spine was performed without the administration of intravenous contrast. Multiplanar reformatted images are provided for review. Automated exposure control, iterative reconstruction, and/or weight based adjustment of the mA/kV was utilized to reduce the radiation dose to as low as reasonably achievable. COMPARISON: None. HISTORY: ORDERING SYSTEM PROVIDED HISTORY: trip and fall, hit head no LOC, no thinners TECHNOLOGIST PROVIDED HISTORY: Has a \"code stroke\" or \"stroke alert\" been called? ->No Reason for exam:->trip and fall, hit head no LOC, no thinners Decision Support Exception - unselect if not a suspected or confirmed emergency medical condition->Emergency Medical Condition (MA) What reading provider will be dictating this exam?->CRC FINDINGS: CT head: There are bilateral subdural hemorrhages. These are predominantly isodense to cerebral cortex. The isodense blood could be hyperacute or subacute. There are areas of hyperdensity in both subdural collections, left greater than right. Hyperdense areas are consistent with acute hemorrhage. The left frontoparietal hemorrhage measures approximately 2.6 cm in thickness while the right frontoparietal subdural hemorrhage measures approximately 1.0 cm in greatest thickness.   There is approximately 7 mm of rightward midline shift. There is compression of the anterior horn left lateral ventricle with effacement of the atrium and posterior horn of the left lateral ventricle. There is effacement of the left frontoparietal sulci. There is no evidence of acute cortical ischemia. The calvarium is intact. The visualized portions of the paranasal sinuses and left mastoid air cells are clear. There is small right mastoid effusion. There is mild sclerosis at the inferior aspect of the right mastoid air cells. CT cervical spine: Examination is mildly degraded by motion. However, there is no evidence of acute fracture. There is grade 1 anterolisthesis at C3-4 and C4-5. The remaining alignment is anatomic. There are no compression deformities. There is narrowing of the intervertebral disc space heights at C4-5 through C6-7. There is lucency through the base of a posterior endplate osteophyte in the midline at C4 which appears likely chronic in nature. The facet joints are intact at all levels. Facet hypertrophy is most pronounced on the left at C3-4 and on the right at C4-5. The prevertebral soft tissue structures are unremarkable. There is multilevel central canal stenosis and neural foraminal narrowing. There is arteriosclerosis. 1. Bilateral mixed subacute and acute subdural hemorrhage, left greater than right. There is approximately 7 mm of rightward midline shift. No evidence of transtentorial herniation. 2. No acute fracture or subluxation within the cervical spine. 3. Degenerative disc disease and facet arthropathy in the cervical spine with multilevel central canal stenosis and neural foraminal narrowing. Findings discussed with Norma Sibley via telephone on 01/13/2023 at 1610 hours [facility] Standard time.      CT CHEST W CONTRAST    Result Date: 1/13/2023  EXAMINATION: CT OF THE CHEST WITH CONTRAST 1/13/2023 7:44 pm TECHNIQUE: CT of the chest was performed with the administration of intravenous contrast. Multiplanar reformatted images are provided for review. Automated exposure control, iterative reconstruction, and/or weight based adjustment of the mA/kV was utilized to reduce the radiation dose to as low as reasonably achievable. COMPARISON: August 16, 2019 HISTORY: ORDERING SYSTEM PROVIDED HISTORY: trauma TECHNOLOGIST PROVIDED HISTORY: Reason for exam:->trauma What reading provider will be dictating this exam?->CRC FINDINGS: The heart is normal in size. No pericardial effusion. Atherosclerotic calcifications are associated with the coronary arteries. No abnormal mediastinal fluid collections. Atherosclerotic plaque associated with the thoracic aorta. Descending thoracic aorta is ectatic measuring up to 3.8 cm. No airspace opacity or pleural effusion. Redemonstration of opacities in peripheral aspect of the superior segment of left lower lobe related to scarring or subsegmental atelectasis. Stable nodule located in lingula measures 5 mm. Stable nodule located in anterior aspect of right middle lobe measures approximately 5 mm. There are expanded airspaces in the upper lobes with multiple bulla and blebs. No pneumothorax. Stable focus of increased attenuation associated with musculature along anterior margin of right scapula measuring up to 8 mm. No evidence of right or left-sided rib fracture. 1. No acute process in the chest. 2. Emphysematous changes. 3. Redemonstration of opacities in peripheral aspect of left lower lobe which could indicate stable scarring or subsegmental atelectasis 4. Redemonstration of stable nodules bilaterally. Follow-up CT could be helpful based on patient risk factors.      CT CERVICAL SPINE WO CONTRAST    Result Date: 1/13/2023  EXAMINATION: CT OF THE HEAD WITHOUT CONTRAST; CT OF THE CERVICAL SPINE WITHOUT CONTRAST 1/13/2023 3:23 pm TECHNIQUE: CT of the head was performed without the administration of intravenous contrast. Automated exposure control, iterative reconstruction, and/or weight based adjustment of the mA/kV was utilized to reduce the radiation dose to as low as reasonably achievable.; CT of the cervical spine was performed without the administration of intravenous contrast. Multiplanar reformatted images are provided for review. Automated exposure control, iterative reconstruction, and/or weight based adjustment of the mA/kV was utilized to reduce the radiation dose to as low as reasonably achievable. COMPARISON: None. HISTORY: ORDERING SYSTEM PROVIDED HISTORY: trip and fall, hit head no LOC, no thinners TECHNOLOGIST PROVIDED HISTORY: Has a \"code stroke\" or \"stroke alert\" been called? ->No Reason for exam:->trip and fall, hit head no LOC, no thinners Decision Support Exception - unselect if not a suspected or confirmed emergency medical condition->Emergency Medical Condition (MA) What reading provider will be dictating this exam?->CRC FINDINGS: CT head: There are bilateral subdural hemorrhages. These are predominantly isodense to cerebral cortex. The isodense blood could be hyperacute or subacute. There are areas of hyperdensity in both subdural collections, left greater than right. Hyperdense areas are consistent with acute hemorrhage. The left frontoparietal hemorrhage measures approximately 2.6 cm in thickness while the right frontoparietal subdural hemorrhage measures approximately 1.0 cm in greatest thickness. There is approximately 7 mm of rightward midline shift. There is compression of the anterior horn left lateral ventricle with effacement of the atrium and posterior horn of the left lateral ventricle. There is effacement of the left frontoparietal sulci. There is no evidence of acute cortical ischemia. The calvarium is intact. The visualized portions of the paranasal sinuses and left mastoid air cells are clear. There is small right mastoid effusion. There is mild sclerosis at the inferior aspect of the right mastoid air cells.  CT cervical spine: Examination is mildly degraded by motion. However, there is no evidence of acute fracture. There is grade 1 anterolisthesis at C3-4 and C4-5. The remaining alignment is anatomic. There are no compression deformities. There is narrowing of the intervertebral disc space heights at C4-5 through C6-7. There is lucency through the base of a posterior endplate osteophyte in the midline at C4 which appears likely chronic in nature. The facet joints are intact at all levels. Facet hypertrophy is most pronounced on the left at C3-4 and on the right at C4-5. The prevertebral soft tissue structures are unremarkable. There is multilevel central canal stenosis and neural foraminal narrowing. There is arteriosclerosis. 1. Bilateral mixed subacute and acute subdural hemorrhage, left greater than right. There is approximately 7 mm of rightward midline shift. No evidence of transtentorial herniation. 2. No acute fracture or subluxation within the cervical spine. 3. Degenerative disc disease and facet arthropathy in the cervical spine with multilevel central canal stenosis and neural foraminal narrowing. Findings discussed with Thomas Willoughby via telephone on 01/13/2023 at 1610 hours [facility] Standard time. CT ABDOMEN PELVIS W IV CONTRAST Additional Contrast? None    Result Date: 1/13/2023  EXAMINATION: CT OF THE ABDOMEN AND PELVIS WITH CONTRAST 1/13/2023 7:44 pm TECHNIQUE: CT of the abdomen and pelvis was performed with the administration of intravenous contrast. Multiplanar reformatted images are provided for review. Automated exposure control, iterative reconstruction, and/or weight based adjustment of the mA/kV was utilized to reduce the radiation dose to as low as reasonably achievable.  COMPARISON: November 18, 2007 HISTORY: ORDERING SYSTEM PROVIDED HISTORY: trauma TECHNOLOGIST PROVIDED HISTORY: Additional Contrast?->None Reason for exam:->trauma What reading provider will be dictating this exam?->CRC FINDINGS: Evidence of prior surgery and aortic repair. There is a fusiform infrarenal abdominal aortic aneurysm measuring up to 3.2 cm appearing slightly larger compared to prior. There is aneurysmal dilatation involving right and left common iliac arteries measuring up to 1.8 cm on the right and 1.9 cm on the left. No retroperitoneal fluid collections. No free air or free fluid. There are numerous diverticula associated with the sigmoid colon. The appendix is visualized and appears unremarkable. No intrahepatic bile duct dilatation. Cyst or hemangioma associated with right hepatic lobe measuring up to 8 mm. Multiple gallstones present in the gallbladder. No evidence of acute pancreatitis. Spleen is normal in size. Adrenal glands are normal. No hydronephrosis. No perinephric edema. Urinary bladder is unremarkable. No hip fracture or dislocation. No pelvis fracture. No evidence of compression fracture involving the lumbar spine. 1. No acute process in abdomen or pelvis. 2. Diverticulosis. 3. Cholelithiasis. 4. Infrarenal abdominal aortic aneurysm measures up to 3.2 cm appearing slightly larger compared to prior from November 18, 2007. XR CHEST PORTABLE    Result Date: 1/13/2023  EXAMINATION: ONE XRAY VIEW OF THE CHEST 1/13/2023 4:57 pm COMPARISON: Chest, single view 11/18/2007 HISTORY: ORDERING SYSTEM PROVIDED HISTORY: Shortness of breath TECHNOLOGIST PROVIDED HISTORY: Reason for exam:->Shortness of breath What reading provider will be dictating this exam?->CRC FINDINGS: Single portable AP view of the chest was obtained. Heart size likely within normal limits. Mediastinal contour and pulmonary vascularity are within normal limits. There is biapical pleuroparenchymal scarring. There is diffuse prominence of the pulmonary interstitium. There is stable blunting of the left lateral costophrenic angle. No acute cardiopulmonary disease.        LABS:  CBC:   Lab Results   Component Value Date/Time    WBC 8.7 01/14/2023 05:30 AM    RBC 4.63 01/14/2023 05:30 AM    HGB 14.0 01/14/2023 05:30 AM    HCT 41.1 01/14/2023 05:30 AM    MCV 88.8 01/14/2023 05:30 AM    MCH 30.2 01/14/2023 05:30 AM    MCHC 34.1 01/14/2023 05:30 AM    RDW 13.6 01/14/2023 05:30 AM     01/14/2023 05:30 AM    MPV 9.8 01/14/2023 05:30 AM     BMP:    Lab Results   Component Value Date/Time     01/14/2023 05:30 AM    K 4.0 01/14/2023 05:30 AM     01/14/2023 05:30 AM    CO2 21 01/14/2023 05:30 AM    BUN 22 01/14/2023 05:30 AM    LABALBU 3.8 01/14/2023 05:30 AM    CREATININE 1.4 01/14/2023 05:30 AM    CALCIUM 9.3 01/14/2023 05:30 AM    GFRAA 45 03/24/2022 04:00 AM    LABGLOM 52 01/14/2023 05:30 AM    GLUCOSE 107 01/14/2023 05:30 AM     Warfarin PT/INR:  No components found for: PTPATWAR, PTINRWAR    IMPRESSION: Bilateral subacute subdural hematomas left much larger than right    RECOMMENDATIONS: Recommend bur hole evacuation of the left sided subdural for now risks and benefits carefully discussed with patient and his family all questions were answered the patient gave informed consent to proceed with bur hole evacuation    Thank you again for this consultation.       Juan David Boyle MD  1/14/2023

## 2023-01-14 NOTE — PROGRESS NOTES
Trauma Tertiary Survey    Admit Date: 1/13/2023  Hospital day 2    CC:  Fall, SDH     Alcohol pre-screening:  Men: How many times in the past year have you had 5 or more drinks in a day?  none  How much do you drink on a daily basis? Never daily    Drug Pre-screening:    How many times in the past year have you used a recreational drug or used a prescription medication for non medical reasons? No    Mood Prescreening:    During the past two weeks, have you been bothered by little interest or pleasure doing things? No  During the past two weeks, have you been bothered by feeling down, depressed or hopeless? No      Scheduled Meds:   ceFAZolin  2,000 mg IntraVENous Q8H    sodium chloride flush  5-40 mL IntraVENous 2 times per day    polyethylene glycol  17 g Oral BID    levETIRAcetam  500 mg Oral BID    amLODIPine  10 mg Oral Daily    metoprolol tartrate  50 mg Oral BID    pravastatin  80 mg Oral Nightly     Continuous Infusions:   sodium chloride       PRN Meds:sodium chloride flush, sodium chloride, acetaminophen, oxyCODONE **OR** oxyCODONE, ondansetron **OR** ondansetron, labetalol, hydrALAZINE    Subjective: Went to OR this morning with neurosurgery, feeling well. No new complaints. Objective:   Patient Vitals for the past 8 hrs:   BP Temp Temp src Pulse Resp SpO2 Weight   01/14/23 1028 137/73 -- -- 80 21 99 % --   01/14/23 1005 127/80 -- -- 84 14 99 % --   01/14/23 1000 -- 97.9 °F (36.6 °C) Oral 87 13 99 % --   01/14/23 0950 122/71 -- -- 89 15 100 % --   01/14/23 0935 128/63 -- -- 85 14 100 % --   01/14/23 0920 131/63 (!) 96.7 °F (35.9 °C) Axillary 83 21 100 % --   01/14/23 0700 126/74 -- -- 77 16 97 % --   01/14/23 0600 (!) 144/74 97.8 °F (36.6 °C) Axillary 72 17 98 % --   01/14/23 0500 (!) 149/76 -- -- 74 10 97 % 172 lb 11.2 oz (78.3 kg)   01/14/23 0400 135/68 97.9 °F (36.6 °C) Axillary 85 21 96 % --       I/O last 3 completed shifts: In: 240 [P.O.:240]  Out: 978 [BJZZL:275]  I/O this shift:   In: 0 [I.V.:700]  Out: 10 [Blood:10]    Past Medical History:   Diagnosis Date    Abdominal aortic aneurysm     CAD (coronary artery disease)     Cancer ([facility] [address]     bladder s/p resection 1994    History of heart attack 2004    Hyperlipidemia     Hypertension     Hypothyroidism     Prominent abdominal aortic pulse 7/22/2021    S/P AAA (abdominal aortic aneurysm) repair 4/19/2012       @[email or web address]@    Radiology:  CT CHEST W CONTRAST   Final Result   1. No acute process in the chest.   2. Emphysematous changes. 3. Redemonstration of opacities in peripheral aspect of left lower lobe which   could indicate stable scarring or subsegmental atelectasis   4. Redemonstration of stable nodules bilaterally. Follow-up CT could be   helpful based on patient risk factors. CT ABDOMEN PELVIS W IV CONTRAST Additional Contrast? None   Final Result   1. No acute process in abdomen or pelvis. 2. Diverticulosis. 3. Cholelithiasis. 4. Infrarenal abdominal aortic aneurysm measures up to 3.2 cm appearing   slightly larger compared to prior from November 18, 2007. XR CHEST PORTABLE   Final Result   No acute cardiopulmonary disease. CT HEAD WO CONTRAST   Final Result   1. Bilateral mixed subacute and acute subdural hemorrhage, left greater than   right. There is approximately 7 mm of rightward midline shift. No evidence   of transtentorial herniation. 2. No acute fracture or subluxation within the cervical spine. 3. 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 Findings discussed with Thomas Willoughby via telephone on 01/13/2023 at 1610   hours [facility] Standard time. CT CERVICAL SPINE WO CONTRAST   Final Result   1. Bilateral mixed subacute and acute subdural hemorrhage, left greater than   right. There is approximately 7 mm of rightward midline shift. No evidence   of transtentorial herniation.    2. No acute fracture or subluxation within the cervical spine. 3. 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 Findings discussed with Lizette Alexander via telephone on 01/13/2023 at 1610   hours [place] Standard time. CT HEAD WO CONTRAST    (Results Pending)       PHYSICAL EXAM:     Central Nervous System  Loss of consciousness:  No    GCS:    Eye:  4 - Opens eyes on own  Motor:  6 - Follows simple motor commands  Verbal:  5 - Alert and oriented    Neuromuscular blockade: No  Pupil size:  Left 2 mm    Right 2 mm  Pupil reaction: Yes    Wiggles fingers: Left Yes Right Yes  Wiggles toes: Left Yes   Right Yes    Hand grasp:   Left  Present      Right  Present  Plantar flexion: Left  Present      Right   Present    PHYSICAL EXAM  General: No apparent distress, comfortable   HEENT: Trachea midline, no masses, Pupils equal round, drain in place with sanguinous drainage   Chest: Respiratory effort was normal with no retractions or use of accessory muscles. Cardiovascular: Extremities warm, well perfused  Abdomen:  Soft and non distended.   No tenderness, guarding, rebound, or rigidity, bruising over Right chest and flank   Extremities: Moves all 4 extremeties, No pedal edema     Spine:   Spine Tenderness ROM   Cervical 0/10 Normal   Thoracic 0 /10 Normal   Lumbar 0 /10 Normal     Musculoskeletal:    Joint Tenderness Swelling ROM   Right shoulder Absent absent normal   Left shoulder absent absent normal   Right elbow absent absent normal   Left elbow absent absent normal   Right wrist absent absent normal   Left wrist absent absent normal   Right hand grasp Absent absent normal   Left hand grasp absent absent normal   Right hip absent absent normal   Left hip absent absent normal   Right knee Absent absent normal   Left knee absent absent normal   Right ankle absent absent normal   Left ankle absent absent normal Right foot Absent absent normal   Left foot absent absent normal       CONSULTS: Neurosurgery    PROCEDURES: none    INJURIES:      Principal Problem:    SDH (subdural hematoma)  Resolved Problems:    * No resolved hospital problems. *        Assessment/Plan:       Neuro:  GCS 15, s/p taz hole with neursorugery 1/14, continue keppra, zoloft  CV: HR near normal limits, no acute issues, continue home BP meds, prn antihypertensizes  Pulm: tolerating room air    GI: tolerating clear liquids  Renal: no acute issues   ID: afebrile, no acute issues, continue prophylactic ancef     Endocrine: no acute issues, continue synthroid   MSK: no acute issues    Heme: no acute issuee    Pain control/Sedation: tylenol, oxycodone,   DVT prophylaxis: scds    GI: cld  Glucose protocol: daily checks  Mouth/Eye care: per patient.    Bynum: none     Code status:    Limited    Patient/Family update:  As available    Disposition:  continue current care       Electronically signed by Rere Gruber MD on 1/14/23 at 11:53 AM EST

## 2023-01-14 NOTE — BRIEF OP NOTE
Brief Postoperative Note      Patient: Ta Garcia  YOB: 1945  MRN: [id number]    Date of Procedure: 1/14/2023    Pre-Op Diagnosis: Chronic subdural hematoma ([address] [I62.03]    Post-Op Diagnosis: Same       Procedure(s):  LEFT PARIETAL ZHEN HOLE FOR SUBDURAL DRAIN    Surgeon(s):  Dorothy Smith MD    Assistant:  * No surgical staff found *    Anesthesia: General    Estimated Blood Loss (mL): 963     Complications: None    Specimens:   * No specimens in log *    Implants:  Implant Name Type Inv. Item Serial No.  Lot No. LRB No. Used Action   COVER BUR H L10MM W/ SHUNT PASS - [id number]  COVER BUR H L10MM W/ SHUNT PASS  MERY JHON-WD  Left 1 Implanted   1.2X48MM W/4MM WL TEAL BANDS      Left 1 Implanted   SCREW BONE L4MM DIA1. 5MM ST SELF CNTR AX Lyndia Lints - [id number]  SCREW BONE L4MM DIA1. 5MM ST SELF CNTR AX STBL UNIII  MERY JHON-WD  Left 4 Implanted         Drains:   Closed/Suction Drain Left Scalp Other (Comment) (Active)   Site Description Clean, dry & intact 01/14/23 0907   Dressing Status New dressing applied;Clean, dry & intact 01/14/23 1503       Findings: subcute SDH    Electronically signed by Dorothy Smith MD on 1/14/2023 at 9:51 AM

## 2023-01-15 ENCOUNTER — APPOINTMENT (OUTPATIENT)
Dept: CT IMAGING | Age: 78
DRG: 025 | End: 2023-01-15
Payer: MEDICARE

## 2023-01-15 PROBLEM — Z51.5 PALLIATIVE CARE ENCOUNTER: Status: ACTIVE | Noted: 2023-01-15

## 2023-01-15 LAB
ALBUMIN SERPL-MCNC: 3.7 G/DL (ref 3.5–5.2)
ALP BLD-CCNC: 70 U/L (ref 40–129)
ALT SERPL-CCNC: 7 U/L (ref 0–40)
ANION GAP SERPL CALCULATED.3IONS-SCNC: 13 MMOL/L (ref 7–16)
ANION GAP SERPL CALCULATED.3IONS-SCNC: 16 MMOL/L (ref 7–16)
AST SERPL-CCNC: 13 U/L (ref 0–39)
BASOPHILS ABSOLUTE: 0.01 E9/L (ref 0–0.2)
BASOPHILS RELATIVE PERCENT: 0.1 % (ref 0–2)
BILIRUB SERPL-MCNC: 0.3 MG/DL (ref 0–1.2)
BUN BLDV-MCNC: 35 MG/DL (ref 6–23)
BUN BLDV-MCNC: 40 MG/DL (ref 6–23)
CALCIUM IONIZED: 1.32 MMOL/L (ref 1.15–1.33)
CALCIUM SERPL-MCNC: 8.4 MG/DL (ref 8.6–10.2)
CALCIUM SERPL-MCNC: 9.1 MG/DL (ref 8.6–10.2)
CHLORIDE BLD-SCNC: 105 MMOL/L (ref 98–107)
CHLORIDE BLD-SCNC: 106 MMOL/L (ref 98–107)
CO2: 20 MMOL/L (ref 22–29)
CO2: 21 MMOL/L (ref 22–29)
CREAT SERPL-MCNC: 1.7 MG/DL (ref 0.7–1.2)
CREAT SERPL-MCNC: 1.8 MG/DL (ref 0.7–1.2)
EOSINOPHILS ABSOLUTE: 0 E9/L (ref 0.05–0.5)
EOSINOPHILS RELATIVE PERCENT: 0 % (ref 0–6)
GFR SERPL CREATININE-BSD FRML MDRD: 38 ML/MIN/1.73
GFR SERPL CREATININE-BSD FRML MDRD: 41 ML/MIN/1.73
GLUCOSE BLD-MCNC: 118 MG/DL (ref 74–99)
GLUCOSE BLD-MCNC: 129 MG/DL (ref 74–99)
HCT VFR BLD CALC: 39.2 % (ref 37–54)
HEMOGLOBIN: 13.2 G/DL (ref 12.5–16.5)
IMMATURE GRANULOCYTES #: 0.07 E9/L
IMMATURE GRANULOCYTES %: 0.6 % (ref 0–5)
LYMPHOCYTES ABSOLUTE: 0.81 E9/L (ref 1.5–4)
LYMPHOCYTES RELATIVE PERCENT: 6.9 % (ref 20–42)
MAGNESIUM: 2.4 MG/DL (ref 1.6–2.6)
MCH RBC QN AUTO: 30.3 PG (ref 26–35)
MCHC RBC AUTO-ENTMCNC: 33.7 % (ref 32–34.5)
MCV RBC AUTO: 90.1 FL (ref 80–99.9)
MONOCYTES ABSOLUTE: 0.6 E9/L (ref 0.1–0.95)
MONOCYTES RELATIVE PERCENT: 5.1 % (ref 2–12)
MRSA CULTURE ONLY: NORMAL
NEUTROPHILS ABSOLUTE: 10.17 E9/L (ref 1.8–7.3)
NEUTROPHILS RELATIVE PERCENT: 87.3 % (ref 43–80)
PDW BLD-RTO: 13.8 FL (ref 11.5–15)
PHOSPHORUS: 4.6 MG/DL (ref 2.5–4.5)
PLATELET # BLD: 183 E9/L (ref 130–450)
PMV BLD AUTO: 10.4 FL (ref 7–12)
POTASSIUM REFLEX MAGNESIUM: 3.7 MMOL/L (ref 3.5–5)
POTASSIUM SERPL-SCNC: 4.3 MMOL/L (ref 3.5–5)
RBC # BLD: 4.35 E12/L (ref 3.8–5.8)
SODIUM BLD-SCNC: 140 MMOL/L (ref 132–146)
SODIUM BLD-SCNC: 141 MMOL/L (ref 132–146)
TOTAL PROTEIN: 6.9 G/DL (ref 6.4–8.3)
WBC # BLD: 11.7 E9/L (ref 4.5–11.5)

## 2023-01-15 PROCEDURE — 6370000000 HC RX 637 (ALT 250 FOR IP): Performed by: SURGERY

## 2023-01-15 PROCEDURE — 84100 ASSAY OF PHOSPHORUS: CPT

## 2023-01-15 PROCEDURE — 70450 CT HEAD/BRAIN W/O DYE: CPT

## 2023-01-15 PROCEDURE — 80053 COMPREHEN METABOLIC PANEL: CPT

## 2023-01-15 PROCEDURE — 2580000003 HC RX 258: Performed by: STUDENT IN AN ORGANIZED HEALTH CARE EDUCATION/TRAINING PROGRAM

## 2023-01-15 PROCEDURE — 99222 1ST HOSP IP/OBS MODERATE 55: CPT | Performed by: NURSE PRACTITIONER

## 2023-01-15 PROCEDURE — 6360000002 HC RX W HCPCS: Performed by: NEUROLOGICAL SURGERY

## 2023-01-15 PROCEDURE — 82330 ASSAY OF CALCIUM: CPT

## 2023-01-15 PROCEDURE — 99291 CRITICAL CARE FIRST HOUR: CPT | Performed by: SURGERY

## 2023-01-15 PROCEDURE — 80048 BASIC METABOLIC PNL TOTAL CA: CPT

## 2023-01-15 PROCEDURE — 6370000000 HC RX 637 (ALT 250 FOR IP): Performed by: STUDENT IN AN ORGANIZED HEALTH CARE EDUCATION/TRAINING PROGRAM

## 2023-01-15 PROCEDURE — 85025 COMPLETE CBC W/AUTO DIFF WBC: CPT

## 2023-01-15 PROCEDURE — 83735 ASSAY OF MAGNESIUM: CPT

## 2023-01-15 PROCEDURE — 2580000003 HC RX 258: Performed by: NEUROLOGICAL SURGERY

## 2023-01-15 PROCEDURE — 36415 COLL VENOUS BLD VENIPUNCTURE: CPT

## 2023-01-15 PROCEDURE — 2000000000 HC ICU R&B

## 2023-01-15 RX ADMIN — LEVETIRACETAM 500 MG: 500 TABLET, FILM COATED ORAL at 09:14

## 2023-01-15 RX ADMIN — LEVETIRACETAM 500 MG: 500 TABLET, FILM COATED ORAL at 19:43

## 2023-01-15 RX ADMIN — ACETAMINOPHEN 650 MG: 325 TABLET ORAL at 23:41

## 2023-01-15 RX ADMIN — METOPROLOL TARTRATE 50 MG: 50 TABLET, FILM COATED ORAL at 19:43

## 2023-01-15 RX ADMIN — SODIUM CHLORIDE, PRESERVATIVE FREE 10 ML: 5 INJECTION INTRAVENOUS at 19:44

## 2023-01-15 RX ADMIN — SERTRALINE 25 MG: 25 TABLET, FILM COATED ORAL at 09:14

## 2023-01-15 RX ADMIN — WATER 2000 MG: 1 INJECTION INTRAMUSCULAR; INTRAVENOUS; SUBCUTANEOUS at 15:44

## 2023-01-15 RX ADMIN — PRAVASTATIN SODIUM 80 MG: 20 TABLET ORAL at 19:44

## 2023-01-15 RX ADMIN — AMLODIPINE BESYLATE 10 MG: 10 TABLET ORAL at 09:14

## 2023-01-15 RX ADMIN — ACETAMINOPHEN 650 MG: 325 TABLET ORAL at 14:19

## 2023-01-15 RX ADMIN — WATER 2000 MG: 1 INJECTION INTRAMUSCULAR; INTRAVENOUS; SUBCUTANEOUS at 09:13

## 2023-01-15 RX ADMIN — METOPROLOL TARTRATE 50 MG: 50 TABLET, FILM COATED ORAL at 09:13

## 2023-01-15 RX ADMIN — LEVOTHYROXINE SODIUM 25 MCG: 0.03 TABLET ORAL at 07:04

## 2023-01-15 RX ADMIN — WATER 2000 MG: 1 INJECTION INTRAMUSCULAR; INTRAVENOUS; SUBCUTANEOUS at 00:04

## 2023-01-15 RX ADMIN — WATER 2000 MG: 1 INJECTION INTRAMUSCULAR; INTRAVENOUS; SUBCUTANEOUS at 23:41

## 2023-01-15 RX ADMIN — SODIUM CHLORIDE, PRESERVATIVE FREE 10 ML: 5 INJECTION INTRAVENOUS at 09:14

## 2023-01-15 ASSESSMENT — PAIN DESCRIPTION - LOCATION: LOCATION: HEAD

## 2023-01-15 ASSESSMENT — PAIN SCALES - GENERAL
PAINLEVEL_OUTOF10: 4
PAINLEVEL_OUTOF10: 0

## 2023-01-15 ASSESSMENT — PAIN DESCRIPTION - DESCRIPTORS: DESCRIPTORS: ACHING

## 2023-01-15 ASSESSMENT — PAIN - FUNCTIONAL ASSESSMENT: PAIN_FUNCTIONAL_ASSESSMENT: ACTIVITIES ARE NOT PREVENTED

## 2023-01-15 NOTE — DISCHARGE SUMMARY
Physician Discharge Summary     Patient ID:  Yamilex Bonilla  [id number]  26 y.o.  1945    Admit date: 1/13/2023    Discharge date and time: No discharge date for patient encounter. Admitting Physician: Abigail Salcido MD     Admission Diagnoses: SDH (subdural hematoma) [S06. 5XAA]    Discharge Diagnoses: Principal Problem:    SDH (subdural hematoma)  Active Problems:    Palliative care encounter    Abdominal aortic aneurysm (AAA) without rupture  Resolved Problems:    * No resolved hospital problems. *      Admission Condition: poor    Discharged Condition: stable    Indication for Admission: SDH    Hospital Course/Procedures/Operation/treatments:   1/13: presented as a trauma consult with SDH with midline shift. Admitted to SICU  1/14: went for taz hole with neurosurgery. GCS 15 postop, advanced to clears. 1/15: No acute events overnight. Advanced to regular diet. Cr elevated this morning off fluids, will repeat. 1/16: No overnight events. Patient feeling well. Tolerating regular diet. Subdural drain to remain in per neurosurgeon until tomorrow. Patient will remain in ICU while drain in place  1/17: no new events or complaints. Tolerating regular diet. Will transfer out of unit once subdural drain has been removed by neurosurgeon  1/18: no new issues.  Discharge home           Consults:   IP CONSULT TO NEUROSURGERY  IP CONSULT TO TRAUMA SURGERY  IP CONSULT TO PALLIATIVE CARE  IP CONSULT TO SPIRITUAL SERVICES    Significant Diagnostic Studies:   CT HEAD WO CONTRAST    Result Date: 1/13/2023  EXAMINATION: CT OF THE HEAD WITHOUT CONTRAST; CT OF THE CERVICAL SPINE WITHOUT CONTRAST 1/13/2023 3:23 pm TECHNIQUE: CT of the head was performed without the administration of intravenous contrast. Automated exposure control, iterative reconstruction, and/or weight based adjustment of the mA/kV was utilized to reduce the radiation dose to as low as reasonably achievable.; CT of the cervical spine was performed without the administration of intravenous contrast. Multiplanar reformatted images are provided for review. Automated exposure control, iterative reconstruction, and/or weight based adjustment of the mA/kV was utilized to reduce the radiation dose to as low as reasonably achievable. COMPARISON: None. HISTORY: ORDERING SYSTEM PROVIDED HISTORY: trip and fall, hit head no LOC, no thinners TECHNOLOGIST PROVIDED HISTORY: Has a \"code stroke\" or \"stroke alert\" been called? ->No Reason for exam:->trip and fall, hit head no LOC, no thinners Decision Support Exception - unselect if not a suspected or confirmed emergency medical condition->Emergency Medical Condition (MA) What reading provider will be dictating this exam?->CRC FINDINGS: CT head: There are bilateral subdural hemorrhages. These are predominantly isodense to cerebral cortex. The isodense blood could be hyperacute or subacute. There are areas of hyperdensity in both subdural collections, left greater than right. Hyperdense areas are consistent with acute hemorrhage. The left frontoparietal hemorrhage measures approximately 2.6 cm in thickness while the right frontoparietal subdural hemorrhage measures approximately 1.0 cm in greatest thickness. There is approximately 7 mm of rightward midline shift. There is compression of the anterior horn left lateral ventricle with effacement of the atrium and posterior horn of the left lateral ventricle. There is effacement of the left frontoparietal sulci. There is no evidence of acute cortical ischemia. The calvarium is intact. The visualized portions of the paranasal sinuses and left mastoid air cells are clear. There is small right mastoid effusion. There is mild sclerosis at the inferior aspect of the right mastoid air cells. CT cervical spine: Examination is mildly degraded by motion. However, there is no evidence of acute fracture. There is grade 1 anterolisthesis at C3-4 and C4-5.   The remaining alignment is anatomic. There are no compression deformities. There is narrowing of the intervertebral disc space heights at C4-5 through C6-7. There is lucency through the base of a posterior endplate osteophyte in the midline at C4 which appears likely chronic in nature. The facet joints are intact at all levels. Facet hypertrophy is most pronounced on the left at C3-4 and on the right at C4-5. The prevertebral soft tissue structures are unremarkable. There is multilevel central canal stenosis and neural foraminal narrowing. There is arteriosclerosis. 1. Bilateral mixed subacute and acute subdural hemorrhage, left greater than right. There is approximately 7 mm of rightward midline shift. No evidence of transtentorial herniation. 2. No acute fracture or subluxation within the cervical spine. 3. Degenerative disc disease and facet arthropathy in the cervical spine with multilevel central canal stenosis and neural foraminal narrowing. Findings discussed with Andrade Zavala via telephone on 01/13/2023 at 1610 hours [place] Standard time. CT CHEST W CONTRAST    Result Date: 1/13/2023  EXAMINATION: CT OF THE CHEST WITH CONTRAST 1/13/2023 7:44 pm TECHNIQUE: CT of the chest was performed with the administration of intravenous contrast. Multiplanar reformatted images are provided for review. Automated exposure control, iterative reconstruction, and/or weight based adjustment of the mA/kV was utilized to reduce the radiation dose to as low as reasonably achievable. COMPARISON: August 16, 2019 HISTORY: ORDERING SYSTEM PROVIDED HISTORY: trauma TECHNOLOGIST PROVIDED HISTORY: Reason for exam:->trauma What reading provider will be dictating this exam?->CRC FINDINGS: The heart is normal in size. No pericardial effusion. Atherosclerotic calcifications are associated with the coronary arteries. No abnormal mediastinal fluid collections. Atherosclerotic plaque associated with the thoracic aorta.   Descending thoracic aorta is ectatic measuring up to 3.8 cm. No airspace opacity or pleural effusion. Redemonstration of opacities in peripheral aspect of the superior segment of left lower lobe related to scarring or subsegmental atelectasis. Stable nodule located in lingula measures 5 mm. Stable nodule located in anterior aspect of right middle lobe measures approximately 5 mm. There are expanded airspaces in the upper lobes with multiple bulla and blebs. No pneumothorax. Stable focus of increased attenuation associated with musculature along anterior margin of right scapula measuring up to 8 mm. No evidence of right or left-sided rib fracture. 1. No acute process in the chest. 2. Emphysematous changes. 3. Redemonstration of opacities in peripheral aspect of left lower lobe which could indicate stable scarring or subsegmental atelectasis 4. Redemonstration of stable nodules bilaterally. Follow-up CT could be helpful based on patient risk factors. CT CERVICAL SPINE WO CONTRAST    Result Date: 1/13/2023  EXAMINATION: CT OF THE HEAD WITHOUT CONTRAST; CT OF THE CERVICAL SPINE WITHOUT CONTRAST 1/13/2023 3:23 pm TECHNIQUE: CT of the head was performed without the administration of intravenous contrast. Automated exposure control, iterative reconstruction, and/or weight based adjustment of the mA/kV was utilized to reduce the radiation dose to as low as reasonably achievable.; CT of the cervical spine was performed without the administration of intravenous contrast. Multiplanar reformatted images are provided for review. Automated exposure control, iterative reconstruction, and/or weight based adjustment of the mA/kV was utilized to reduce the radiation dose to as low as reasonably achievable. COMPARISON: None. HISTORY: ORDERING SYSTEM PROVIDED HISTORY: trip and fall, hit head no LOC, no thinners TECHNOLOGIST PROVIDED HISTORY: Has a \"code stroke\" or \"stroke alert\" been called? ->No Reason for exam:->trip and fall, hit head no LOC, no thinners Decision Support Exception - unselect if not a suspected or confirmed emergency medical condition->Emergency Medical Condition (MA) What reading provider will be dictating this exam?->CRC FINDINGS: CT head: There are bilateral subdural hemorrhages. These are predominantly isodense to cerebral cortex. The isodense blood could be hyperacute or subacute. There are areas of hyperdensity in both subdural collections, left greater than right. Hyperdense areas are consistent with acute hemorrhage. The left frontoparietal hemorrhage measures approximately 2.6 cm in thickness while the right frontoparietal subdural hemorrhage measures approximately 1.0 cm in greatest thickness. There is approximately 7 mm of rightward midline shift. There is compression of the anterior horn left lateral ventricle with effacement of the atrium and posterior horn of the left lateral ventricle. There is effacement of the left frontoparietal sulci. There is no evidence of acute cortical ischemia. The calvarium is intact. The visualized portions of the paranasal sinuses and left mastoid air cells are clear. There is small right mastoid effusion. There is mild sclerosis at the inferior aspect of the right mastoid air cells. CT cervical spine: Examination is mildly degraded by motion. However, there is no evidence of acute fracture. There is grade 1 anterolisthesis at C3-4 and C4-5. The remaining alignment is anatomic. There are no compression deformities. There is narrowing of the intervertebral disc space heights at C4-5 through C6-7. There is lucency through the base of a posterior endplate osteophyte in the midline at C4 which appears likely chronic in nature. The facet joints are intact at all levels. Facet hypertrophy is most pronounced on the left at C3-4 and on the right at C4-5. The prevertebral soft tissue structures are unremarkable. There is multilevel central canal stenosis and neural foraminal narrowing. There is arteriosclerosis. 1. Bilateral mixed subacute and acute subdural hemorrhage, left greater than right. There is approximately 7 mm of rightward midline shift. No evidence of transtentorial herniation. 2. No acute fracture or subluxation within the cervical spine. 3. Degenerative disc disease and facet arthropathy in the cervical spine with multilevel central canal stenosis and neural foraminal narrowing. Findings discussed with Varsha Mahoney via telephone on 01/13/2023 at 1610 hours [place] Standard time. CT ABDOMEN PELVIS W IV CONTRAST Additional Contrast? None    Result Date: 1/13/2023  EXAMINATION: CT OF THE ABDOMEN AND PELVIS WITH CONTRAST 1/13/2023 7:44 pm TECHNIQUE: CT of the abdomen and pelvis was performed with the administration of intravenous contrast. Multiplanar reformatted images are provided for review. Automated exposure control, iterative reconstruction, and/or weight based adjustment of the mA/kV was utilized to reduce the radiation dose to as low as reasonably achievable. COMPARISON: November 18, 2007 HISTORY: ORDERING SYSTEM PROVIDED HISTORY: trauma TECHNOLOGIST PROVIDED HISTORY: Additional Contrast?->None Reason for exam:->trauma What reading provider will be dictating this exam?->CRC FINDINGS: Evidence of prior surgery and aortic repair. There is a fusiform infrarenal abdominal aortic aneurysm measuring up to 3.2 cm appearing slightly larger compared to prior. There is aneurysmal dilatation involving right and left common iliac arteries measuring up to 1.8 cm on the right and 1.9 cm on the left. No retroperitoneal fluid collections. No free air or free fluid. There are numerous diverticula associated with the sigmoid colon. The appendix is visualized and appears unremarkable. No intrahepatic bile duct dilatation. Cyst or hemangioma associated with right hepatic lobe measuring up to 8 mm. Multiple gallstones present in the gallbladder.   No evidence of acute pancreatitis. Spleen is normal in size. Adrenal glands are normal. No hydronephrosis. No perinephric edema. Urinary bladder is unremarkable. No hip fracture or dislocation. No pelvis fracture. No evidence of compression fracture involving the lumbar spine. 1. No acute process in abdomen or pelvis. 2. Diverticulosis. 3. Cholelithiasis. 4. Infrarenal abdominal aortic aneurysm measures up to 3.2 cm appearing slightly larger compared to prior from November 18, 2007. XR CHEST PORTABLE    Result Date: 1/13/2023  EXAMINATION: ONE XRAY VIEW OF THE CHEST 1/13/2023 4:57 pm COMPARISON: Chest, single view 11/18/2007 HISTORY: ORDERING SYSTEM PROVIDED HISTORY: Shortness of breath TECHNOLOGIST PROVIDED HISTORY: Reason for exam:->Shortness of breath What reading provider will be dictating this exam?->CRC FINDINGS: Single portable AP view of the chest was obtained. Heart size likely within normal limits. Mediastinal contour and pulmonary vascularity are within normal limits. There is biapical pleuroparenchymal scarring. There is diffuse prominence of the pulmonary interstitium. There is stable blunting of the left lateral costophrenic angle. No acute cardiopulmonary disease. Discharge Exam:     GENERAL:  NAD. A&Ox3. HEAD:  Normocephalic. Left-sided subdural drain present with sanguinous drainage  EYES:   No scleral icterus. PERRLA. LUNGS:  No increased work of breathing. CTAB. CARDIOVASCULAR: Warm throughout. Regular rate  ABDOMEN:  Soft, non-distended, non-tender. No guarding, rigidity, rebound. R flank ecchymosis  EXTREMITIES:   MAEx4. Atraumatic. No LE edema. 5/5 motor and sensation in all 4 extremities  SKIN:  Warm and dry  NEUROLOGIC:  GCS 15    Disposition: home    In process/preliminary results:  Outstanding Order Results       No orders found from 12/15/2022 to 1/14/2023.             Patient Instructions:   Current Discharge Medication List             Details   rosuvastatin (CRESTOR) 20 MG tablet Take 20 mg by mouth daily      sertraline (ZOLOFT) 25 MG tablet Take 25 mg by mouth daily      levothyroxine (SYNTHROID) 25 MCG tablet Take 25 mcg by mouth Daily      metoprolol (LOPRESSOR) 50 MG tablet Take 50 mg by mouth 2 times daily. amlodipine (NORVASC) 10 MG tablet Take 10 mg by mouth daily. aspirin 81 MG chewable tablet Take 81 mg by mouth daily. vitamin D (CHOLECALCIFEROL) 50 MCG (2000 UT) TABS tablet Take 2,000 Units by mouth daily. TRAUMA SERVICES DISCHARGE INSTRUCTIONS    Call [phone], option 2, for any questions/concerns and for follow-up appointment in 2 week(s) after rehab discharge. Please follow the instructions checked below:    During the course of your workup, we identified an incidental finding of:  abdominal ortic aneurysm. Please follow-up with your primary care provider. ACTIVITY INSTRUCTIONS  Increase activity as tolerated  No heavy lifting or strenuous activity  Take your incentive spirometer home and use 4-6 times/day   [x]  No driving until cleared by Neurosurery    WOUND/DRESSING INSTRUCTIONS:  You may shower. No sitting in bath tub, hot tub or swimming until cleared by physician. Ice to areas of pain for first 24 hours. Heat to areas of pain after that. Wash areas of lacerations/abrasions with soap & water. Rinse well. Pat dry with clean towel. Apply thin layer of Bacitracin, Neosporin, or triple antibiotic cream to affected area 2-3 times per day. Keep wounds clean and dry. []  Sutures/Staples are to be removed in 2  week(s). CT scan prior to Dr Matt Knott appointment. MEDICATION INSTRUCTIONS  Take medication as prescribed. When taking pain medications, you may experience dizziness or drowsiness. Do not drink alcohol or drive when taking these medications. You may experience constipation while taking pain medication.   You may take over the counter stool softeners such as docusate (Colace), sennosides S (Senokot-S), or Miralax. [x]  You may take acetaminophen (Tylenol) products. Do NOT take more than 4000mg of Tylenol in 24h. OPIOID MEDICATION INSTRUCTIONS  Read the medication guide that is included with your prescription. Take your medication exactly as prescribed. Store medication away from children and in a safe place. Do NOT share your medication with others. Do NOT take medication unless it is prescribed for you. Do NOT drink alcohol while taking opioids (I.e., Norco, Percocet, Oxycodone, etc). Discuss with the Trauma Clinic staff if the dose of medication you are taking does not control your pain and any side effects that you may be having. CALL 911 OR YOUR LOCAL EMERGENCY SERVICE:  --If you take too much medication  --If you have trouble breathing or shortness of breath  --A child has taken this medication. WORK:  You may not return to work until you receive follow-up with the Trauma Clinic or clearance by all consultants. Call the trauma clinic for any of the following or for questions/concerns;  --fever over 101F  --redness, swelling, hardness or warmth at the wound site(s). --Unrelieved nausea/vomiting  --Foul smelling or cloudy drainage at the wound site(s)  --Unrelieved pain or increase in pain  --Increase in shortness of breath    Follow-up:  Trauma Clinic: [phone] option 400 Lance Fletcher      SPECIAL CONSIDERATIONS FOR OUR PATIENTS OVER THE AGE OF 65Y    Getting around your home safely can be a challenge if you have injuries or health problems that make it easy for you to fall. Loose rugs and furniture in walkways are among the dangers for many older people who have problems walking or who have poor eyesight. People who have conditions such as arthritis, osteoporosis, or dementia also must be careful not to fall. You can make your home safer with a few simple measures.     Follow-up care is a key part of your treatment and safety. Be sure to make and go to all appointments, and call your doctor or nurse call line if you are having problems. It's also a good idea to know your test results and keep a list of the medicines you take. How can you care for yourself at home? Taking care of yourself  You may get dizzy if you do not drink enough water. To prevent dehydration, drink plenty of fluids, enough so that your urine is light yellow or clear like water. Choose water and other caffeine-free clear liquids. If you have kidney, heart, or liver disease and have to limit fluids, talk with your doctor before you increase the amount of fluids you drink. Exercise regularly to improve your strength, muscle tone, and balance. Walk if you can. Swimming may be a good choice if you cannot walk easily. Have your vision and hearing checked each year or any time you notice a change. If you have trouble seeing and hearing, you might not be able to avoid objects and could lose your balance. Know the side effects of the medicines you take. Ask your doctor or pharmacist whether the medicines you take can affect your balance. Sleeping pills or sedatives can affect your balance. Limit the amount of alcohol you drink. Alcohol can impair your balance and other senses. Ask your doctor whether calluses or corns on your feet need to be removed. If you wear loose-fitting shoes because of calluses or corns, you can lose your balance and fall. Talk to your doctor if you have numbness in your feet. Preventing falls at home  Remove raised doorway thresholds, throw rugs, and clutter. Repair loose carpet or raised areas in the floor. Move furniture and electrical cords to keep them out of walking paths. Use non-skid floor wax, and wipe up spills right away, especially on ceramic tile floors. If you use a walker or cane, put rubber tips on it.  If you use crutches, clean the bottoms of them regularly with an abrasive pad, such as steel wool.  Keep your house well lit, especially [facility], and outside walkways. Use night-lights in areas such as hallways and washrooms. Add extra light switches or use remote switches (such as switches that go on or off when you clap your hands) to make it easier to turn lights on if you have to get up during the night. Install sturdy handrails on stairways. Move items in your cabinets so that the things you use a lot are on the lower shelves (about waist level). Keep a cordless phone and a flashlight with new batteries by your bed. If possible, put a phone in each of the main rooms of your house, or carry a cell phone in case you fall and cannot reach a phone. Or, you can wear a device around your neck or wrist. You push a button that sends a signal for help. Wear low-heeled shoes that fit well and give your feet good support. Use footwear with non-skid soles. Check the heels and soles of your shoes for wear. Repair or replace worn heels or soles. Do not wear socks without shoes on wood floors. Walk on the grass when the sidewalks are slippery. If you live in an area that gets snow and ice in the winter, sprinkle salt on slippery steps and sidewalks. Preventing falls in the bath  Install grab bars and non-skid mats inside and outside your shower or tub and near the toilet and sinks. Use shower chairs and bath benches. Use a hand-held shower head that will allow you to sit while showering. Get into a tub or shower by putting the weaker leg in first. Get out of a tub or shower with your strong side first.  Repair loose toilet seats and consider installing a raised toilet seat to make getting on and off the toilet easier. Keep your washroom door unlocked while you are in the shower.     Follow-up:  Trauma Clinic: [phone] option 2  [address] [facility], [address] [place]              Follow up:   David Duke DO  [address] [facility] [place] [address]  [phone]    Schedule an appointment as soon as possible for a visit in 2 week(s)  For follow up    Ebenezer Ramirez, [address] [place] [address] [address]  [phone]    Schedule an appointment as soon as possible for a visit in 2 week(s)  For follow up    [address] [facility]  [address] [phone]  Schedule an appointment as soon as possible for a visit in 2 week(s)  For follow up     Signed:  Carley Pagan DO  1/18/2023  10:29 AM

## 2023-01-15 NOTE — PROGRESS NOTES
Neurosurg progress note  VITALS:  BP 82/67   Pulse 67   Temp 97.9 °F (36.6 °C) (Oral)   Resp 18   Ht 5' 10\" (1.778 m)   Wt 174 lb 12.8 oz (79.3 kg)   SpO2 100%   BMI 25.08 kg/m²   24HR INTAKE/OUTPUT:    Intake/Output Summary (Last 24 hours) at 1/15/2023 1106  Last data filed at 1/15/2023 0600  Gross per 24 hour   Intake 240 ml   Output 400 ml   Net -160 ml     CT HEAD WO CONTRAST    Result Date: 1/13/2023  EXAMINATION: CT OF THE HEAD WITHOUT CONTRAST; CT OF THE CERVICAL SPINE WITHOUT CONTRAST 1/13/2023 3:23 pm TECHNIQUE: CT of the head was performed without the administration of intravenous contrast. Automated exposure control, iterative reconstruction, and/or weight based adjustment of the mA/kV was utilized to reduce the radiation dose to as low as reasonably achievable.; CT of the cervical spine was performed without the administration of intravenous contrast. Multiplanar reformatted images are provided for review. Automated exposure control, iterative reconstruction, and/or weight based adjustment of the mA/kV was utilized to reduce the radiation dose to as low as reasonably achievable. COMPARISON: None. HISTORY: ORDERING SYSTEM PROVIDED HISTORY: trip and fall, hit head no LOC, no thinners TECHNOLOGIST PROVIDED HISTORY: Has a \"code stroke\" or \"stroke alert\" been called? ->No Reason for exam:->trip and fall, hit head no LOC, no thinners Decision Support Exception - unselect if not a suspected or confirmed emergency medical condition->Emergency Medical Condition (MA) What reading provider will be dictating this exam?->CRC FINDINGS: CT head: There are bilateral subdural hemorrhages. These are predominantly isodense to cerebral cortex. The isodense blood could be hyperacute or subacute. There are areas of hyperdensity in both subdural collections, left greater than right. Hyperdense areas are consistent with acute hemorrhage.   The left frontoparietal hemorrhage measures approximately 2.6 cm in thickness while the right frontoparietal subdural hemorrhage measures approximately 1.0 cm in greatest thickness. There is approximately 7 mm of rightward midline shift. There is compression of the anterior horn left lateral ventricle with effacement of the atrium and posterior horn of the left lateral ventricle. There is effacement of the left frontoparietal sulci. There is no evidence of acute cortical ischemia. The calvarium is intact. The visualized portions of the paranasal sinuses and left mastoid air cells are clear. There is small right mastoid effusion. There is mild sclerosis at the inferior aspect of the right mastoid air cells. CT cervical spine: Examination is mildly degraded by motion. However, there is no evidence of acute fracture. There is grade 1 anterolisthesis at C3-4 and C4-5. The remaining alignment is anatomic. There are no compression deformities. There is narrowing of the intervertebral disc space heights at C4-5 through C6-7. There is lucency through the base of a posterior endplate osteophyte in the midline at C4 which appears likely chronic in nature. The facet joints are intact at all levels. Facet hypertrophy is most pronounced on the left at C3-4 and on the right at C4-5. The prevertebral soft tissue structures are unremarkable. There is multilevel central canal stenosis and neural foraminal narrowing. There is arteriosclerosis. 1. Bilateral mixed subacute and acute subdural hemorrhage, left greater than right. There is approximately 7 mm of rightward midline shift. No evidence of transtentorial herniation. 2. No acute fracture or subluxation within the cervical spine. 3. Degenerative disc disease and facet arthropathy in the cervical spine with multilevel central canal stenosis and neural foraminal narrowing. Findings discussed with [employer] via telephone on 01/13/2023 at 1610 hours [place] Standard time.      CT CHEST W CONTRAST    Result Date: 1/13/2023  EXAMINATION: CT OF THE CHEST WITH CONTRAST 1/13/2023 7:44 pm TECHNIQUE: CT of the chest was performed with the administration of intravenous contrast. Multiplanar reformatted images are provided for review. Automated exposure control, iterative reconstruction, and/or weight based adjustment of the mA/kV was utilized to reduce the radiation dose to as low as reasonably achievable. COMPARISON: August 16, 2019 HISTORY: ORDERING SYSTEM PROVIDED HISTORY: trauma TECHNOLOGIST PROVIDED HISTORY: Reason for exam:->trauma What reading provider will be dictating this exam?->CRC FINDINGS: The heart is normal in size. No pericardial effusion. Atherosclerotic calcifications are associated with the coronary arteries. No abnormal mediastinal fluid collections. Atherosclerotic plaque associated with the thoracic aorta. Descending thoracic aorta is ectatic measuring up to 3.8 cm. No airspace opacity or pleural effusion. Redemonstration of opacities in peripheral aspect of the superior segment of left lower lobe related to scarring or subsegmental atelectasis. Stable nodule located in lingula measures 5 mm. Stable nodule located in anterior aspect of right middle lobe measures approximately 5 mm. There are expanded airspaces in the upper lobes with multiple bulla and blebs. No pneumothorax. Stable focus of increased attenuation associated with musculature along anterior margin of right scapula measuring up to 8 mm. No evidence of right or left-sided rib fracture. 1. No acute process in the chest. 2. Emphysematous changes. 3. Redemonstration of opacities in peripheral aspect of left lower lobe which could indicate stable scarring or subsegmental atelectasis 4. Redemonstration of stable nodules bilaterally. Follow-up CT could be helpful based on patient risk factors.      CT CERVICAL SPINE WO CONTRAST    Result Date: 1/13/2023  EXAMINATION: CT OF THE HEAD WITHOUT CONTRAST; CT OF THE CERVICAL SPINE WITHOUT CONTRAST 1/13/2023 3:23 pm TECHNIQUE: CT of the head was performed without the administration of intravenous contrast. Automated exposure control, iterative reconstruction, and/or weight based adjustment of the mA/kV was utilized to reduce the radiation dose to as low as reasonably achievable.; CT of the cervical spine was performed without the administration of intravenous contrast. Multiplanar reformatted images are provided for review. Automated exposure control, iterative reconstruction, and/or weight based adjustment of the mA/kV was utilized to reduce the radiation dose to as low as reasonably achievable. COMPARISON: None. HISTORY: ORDERING SYSTEM PROVIDED HISTORY: trip and fall, hit head no LOC, no thinners TECHNOLOGIST PROVIDED HISTORY: Has a \"code stroke\" or \"stroke alert\" been called? ->No Reason for exam:->trip and fall, hit head no LOC, no thinners Decision Support Exception - unselect if not a suspected or confirmed emergency medical condition->Emergency Medical Condition (MA) What reading provider will be dictating this exam?->CRC FINDINGS: CT head: There are bilateral subdural hemorrhages. These are predominantly isodense to cerebral cortex. The isodense blood could be hyperacute or subacute. There are areas of hyperdensity in both subdural collections, left greater than right. Hyperdense areas are consistent with acute hemorrhage. The left frontoparietal hemorrhage measures approximately 2.6 cm in thickness while the right frontoparietal subdural hemorrhage measures approximately 1.0 cm in greatest thickness. There is approximately 7 mm of rightward midline shift. There is compression of the anterior horn left lateral ventricle with effacement of the atrium and posterior horn of the left lateral ventricle. There is effacement of the left frontoparietal sulci. There is no evidence of acute cortical ischemia. The calvarium is intact.   The visualized portions of the paranasal sinuses and left mastoid air cells are clear. There is small right mastoid effusion. There is mild sclerosis at the inferior aspect of the right mastoid air cells. CT cervical spine: Examination is mildly degraded by motion. However, there is no evidence of acute fracture. There is grade 1 anterolisthesis at C3-4 and C4-5. The remaining alignment is anatomic. There are no compression deformities. There is narrowing of the intervertebral disc space heights at C4-5 through C6-7. There is lucency through the base of a posterior endplate osteophyte in the midline at C4 which appears likely chronic in nature. The facet joints are intact at all levels. Facet hypertrophy is most pronounced on the left at C3-4 and on the right at C4-5. The prevertebral soft tissue structures are unremarkable. There is multilevel central canal stenosis and neural foraminal narrowing. There is arteriosclerosis. 1. Bilateral mixed subacute and acute subdural hemorrhage, left greater than right. There is approximately 7 mm of rightward midline shift. No evidence of transtentorial herniation. 2. No acute fracture or subluxation within the cervical spine. 3. Degenerative disc disease and facet arthropathy in the cervical spine with multilevel central canal stenosis and neural foraminal narrowing. Findings discussed with Jocelyn Carter via telephone on 01/13/2023 at 1610 hours [facility] Standard time. CT ABDOMEN PELVIS W IV CONTRAST Additional Contrast? None    Result Date: 1/13/2023  EXAMINATION: CT OF THE ABDOMEN AND PELVIS WITH CONTRAST 1/13/2023 7:44 pm TECHNIQUE: CT of the abdomen and pelvis was performed with the administration of intravenous contrast. Multiplanar reformatted images are provided for review. Automated exposure control, iterative reconstruction, and/or weight based adjustment of the mA/kV was utilized to reduce the radiation dose to as low as reasonably achievable.  COMPARISON: November 18, 2007 HISTORY: [address] PROVIDED HISTORY: trauma TECHNOLOGIST PROVIDED HISTORY: Additional Contrast?->None Reason for exam:->trauma What reading provider will be dictating this exam?->CRC FINDINGS: Evidence of prior surgery and aortic repair. There is a fusiform infrarenal abdominal aortic aneurysm measuring up to 3.2 cm appearing slightly larger compared to prior. There is aneurysmal dilatation involving right and left common iliac arteries measuring up to 1.8 cm on the right and 1.9 cm on the left. No retroperitoneal fluid collections. No free air or free fluid. There are numerous diverticula associated with the sigmoid colon. The appendix is visualized and appears unremarkable. No intrahepatic bile duct dilatation. Cyst or hemangioma associated with right hepatic lobe measuring up to 8 mm. Multiple gallstones present in the gallbladder. No evidence of acute pancreatitis. Spleen is normal in size. Adrenal glands are normal. No hydronephrosis. No perinephric edema. Urinary bladder is unremarkable. No hip fracture or dislocation. No pelvis fracture. No evidence of compression fracture involving the lumbar spine. 1. No acute process in abdomen or pelvis. 2. Diverticulosis. 3. Cholelithiasis. 4. Infrarenal abdominal aortic aneurysm measures up to 3.2 cm appearing slightly larger compared to prior from November 18, 2007. XR CHEST PORTABLE    Result Date: 1/13/2023  EXAMINATION: ONE XRAY VIEW OF THE CHEST 1/13/2023 4:57 pm COMPARISON: Chest, single view 11/18/2007 HISTORY: ORDERING SYSTEM PROVIDED HISTORY: Shortness of breath TECHNOLOGIST PROVIDED HISTORY: Reason for exam:->Shortness of breath What reading provider will be dictating this exam?->CRC FINDINGS: Single portable AP view of the chest was obtained. Heart size likely within normal limits. Mediastinal contour and pulmonary vascularity are within normal limits. There is biapical pleuroparenchymal scarring.   There is diffuse prominence of the pulmonary interstitium. There is stable blunting of the left lateral costophrenic angle. No acute cardiopulmonary disease.      CBC:   Lab Results   Component Value Date/Time    WBC 11.7 01/15/2023 05:00 AM    RBC 4.35 01/15/2023 05:00 AM    HGB 13.2 01/15/2023 05:00 AM    HCT 39.2 01/15/2023 05:00 AM    MCV 90.1 01/15/2023 05:00 AM    MCH 30.3 01/15/2023 05:00 AM    MCHC 33.7 01/15/2023 05:00 AM    RDW 13.8 01/15/2023 05:00 AM     01/15/2023 05:00 AM    MPV 10.4 01/15/2023 05:00 AM     BMP:    Lab Results   Component Value Date/Time     01/15/2023 05:00 AM    K 4.3 01/15/2023 05:00 AM     01/15/2023 05:00 AM    CO2 20 01/15/2023 05:00 AM    BUN 35 01/15/2023 05:00 AM    LABALBU 3.7 01/15/2023 05:00 AM    CREATININE 1.8 01/15/2023 05:00 AM    CALCIUM 9.1 01/15/2023 05:00 AM    GFRAA 45 03/24/2022 04:00 AM    LABGLOM 38 01/15/2023 05:00 AM    GLUCOSE 129 01/15/2023 05:00 AM      ceFAZolin  2,000 mg IntraVENous Q8H    levothyroxine  25 mcg Oral Daily    sertraline  25 mg Oral Daily    sodium chloride flush  5-40 mL IntraVENous 2 times per day    polyethylene glycol  17 g Oral BID    levETIRAcetam  500 mg Oral BID    amLODIPine  10 mg Oral Daily    metoprolol tartrate  50 mg Oral BID    pravastatin  80 mg Oral Nightly     Patient remains neuro intact awake alert oriented friendly cooperative drain functioning well head CT results noted  Assessment:  Patient Active Problem List   Diagnosis    S/P AAA (abdominal aortic aneurysm) repair    Prominent abdominal aortic pulse    History of tobacco use    SDH (subdural hematoma)     Plan:Continue current care  Samson Quinn MD M.D.

## 2023-01-15 NOTE — PROGRESS NOTES
[facility]  SURGICAL INTENSIVE CARE UNIT (SICU)  ATTENDING PHYSICIAN CRITICAL CARE PROGRESS NOTE     I have examined the patient, reviewed the record,and discussed the case with the APN/  Resident. I have reviewed all relevant labs and imaging data. The following summarizes my clinical findings and independent assessment. Date of admission:  1/13/2023    CC: SDH fall 1 week ago     HOSPITAL COURSE: Fall 1 week prior but has been slowly loosing his balance over the last month. The think he may have fell back then as well. Shuffling his feet and loosing his step. 1/14 OR Bur hole     New Imaging Reviewed and personally interpreted:    CT head- Bilateral SDH with midline shift     New Labs reviewed sodium 141, creatinine 1.4, calcium 1.34, LFTs normal, white blood cell count 8.7, hemoglobin 14, platelet count 598          Physical Exam  HENT:      Head:      Comments: SD drain with sanguinous drainage      Mouth/Throat:      Mouth: Mucous membranes are moist.   Eyes:      Extraocular Movements: Extraocular movements intact. Pupils: Pupils are equal, round, and reactive to light. Cardiovascular:      Rate and Rhythm: Normal rate. Pulmonary:      Effort: Pulmonary effort is normal.   Abdominal:      General: Abdomen is flat. There is no distension. Comments: Right flank ecchymosis    Musculoskeletal:         General: Normal range of motion. Cervical back: Neck supple. Skin:     General: Skin is warm. Neurological:      General: No focal deficit present. Mental Status: He is alert and oriented to person, place, and time. Psychiatric:         Mood and Affect: Mood normal.       Assessment   Principal Problem:    SDH (subdural hematoma)  Resolved Problems:    * No resolved hospital problems.  *      Plan   GI: Clear Liquids , glycolax  Neuro: prn Oxycodone   Neurosurgery following , Maintain Normonatremia >140, All IV infusions and IVPB should be in 0.9% NaCl if available , and   s/p bur hole, reorder home Zoloft  Renal: Hep lock IV, Monitor Urine Output, Daily CBC,BMP, Mg,Phos, ionized Ca  Musculoskeletal: WBAT all extremities , Spines Clear AM-PAC Score pending  Pulmonary: Aggressive pulmonary hygiene , SMI , Monitor RR and Maintain SpO2 > 92%  ID:  ancef while drain in place     Monitor leukocytosis and Monitor Fever Curve  Heme: No indication for Transfusion ,   Monitor Hb   Cardiac: Monitor Hemodynamics  Norvasc, metoprolol, statin hold aspirin  Endocrine: Maintain glucose <180   ordered home Synthroid    DVT Prophylaxis: PCDs, Hold Chemoprophylaxis until  cleared by Neurosurgery    Ulcer Prophylaxis: No Ulcer Prophylaxis Indicated   Tubes and Lines: PIV, subdural drain  Seizure proph:     Keppra  Ancillary consults:   Neurosurgery, Palliative Care, and PT/OT    Family Update:        Discussed with the family in the room   CODE Status:       DNR-Limited- No intubation, chest compressions electro cardioversion or medications in event of a cardiac arrest       Dispo: VITO Rocha MD    Critical Care: 34 minutes evaluating and managing patient with Risk of neurological decompensation,, requiring frequent and emergent imaging, lab studies, intensive monitoring, data review, and adjusting the clinical plan as well as urgent coordination with multiple specialists. , and At risk for further deterioration  time exclusive of teaching and procedures

## 2023-01-15 NOTE — PROGRESS NOTES
[facility]  SURGICAL INTENSIVE CARE UNIT (SICU)  ATTENDING PHYSICIAN CRITICAL CARE PROGRESS NOTE     I have examined the patient, reviewed the record,and discussed the case with the APN/  Resident. I have reviewed all relevant labs and imaging data. The following summarizes my clinical findings and independent assessment. Date of admission:  1/13/2023    CC: SDH fall 1 week ago     HOSPITAL COURSE: Fall 1 week prior but has been slowly loosing his balance over the last month. The think he may have fell back then as well. Shuffling his feet and loosing his step. 1/14 OR Bur hole   1/15 no acute issues     New Imaging Reviewed and personally interpreted:    CT head repeat subdural on the right side, improved subdural on the left side with drain in place    New Labs reviewed sodium 141, creatinine increased to 1.8, mag 2.4, LFTs normal, white blood cell count 11.7, hemoglobin stable 13.2, platelet count 23        Physical Exam  HENT:      Head:      Comments: SD drain with sanguinous drainage      Mouth/Throat:      Mouth: Mucous membranes are moist.   Eyes:      Extraocular Movements: Extraocular movements intact. Pupils: Pupils are equal, round, and reactive to light. Cardiovascular:      Rate and Rhythm: Normal rate. Pulmonary:      Effort: Pulmonary effort is normal.   Abdominal:      General: Abdomen is flat. There is no distension. Comments: Right flank ecchymosis    Musculoskeletal:         General: Normal range of motion. Cervical back: Neck supple. Skin:     General: Skin is warm. Neurological:      General: No focal deficit present. Mental Status: He is alert and oriented to person, place, and time. Psychiatric:         Mood and Affect: Mood normal.       Assessment   Principal Problem:    SDH (subdural hematoma)  Resolved Problems:    * No resolved hospital problems.  *      Plan   GI: Regular Diet , glycolax  Neuro: Stop prn Oxycodone not taking it Neurosurgery following , Maintain Normonatremia >140, All IV infusions and IVPB should be in 0.9% NaCl if available , and   s/p bur hole, home Zoloft  Renal:  possible acute on chronic kidney injury-we will check a BMP this afternoon if it increases will restart IV fluids it seems that his baseline back in March was 1.7-1.8, monitor Urine Output, Daily CBC,BMP, Mg,Phos, ionized Ca  Musculoskeletal: WBAT all extremities , Spines Clear AM-PAC Score pending  Pulmonary: Aggressive pulmonary hygiene , SMI , Monitor RR and Maintain SpO2 > 92%  ID:  ancef while drain in place     Monitor leukocytosis and Monitor Fever Curve  Heme: No indication for Transfusion ,   Monitor Hb   Cardiac: Monitor Hemodynamics  Norvasc, metoprolol, statin hold aspirin  Endocrine: Maintain glucose <180   ordered home Synthroid    DVT Prophylaxis: PCDs, Hold Chemoprophylaxis until  cleared by Neurosurgery    Ulcer Prophylaxis: No Ulcer Prophylaxis Indicated   Tubes and Lines: PIV, subdural drain  Seizure proph:     Keppra  Ancillary consults:   Neurosurgery, Palliative Care, and PT/OT    Family Update:        As available  CODE Status:       DNR-Limited- No intubation, chest compressions electro cardioversion or medications in event of a cardiac arrest       Dispo: SICU    Nathanael Morris MD    Critical Care: 34 minutes evaluating and managing patient with Risk of neurological decompensation,, requiring frequent and emergent imaging, lab studies, intensive monitoring, data review, and adjusting the clinical plan as well as urgent coordination with multiple specialists. , and At risk for further deterioration  time exclusive of teaching and procedures

## 2023-01-15 NOTE — CONSULTS
Palliative Care Department  [phone]  Palliative Care Initial Consult  Provider QUINCY Loera CNP     Alberto Eduardo  [id number]  Hospital Day: 3  Date of Initial Consult: 1/15/2023  Referring Provider: Des Leyva MD  Palliative Medicine was consulted for assistance with: Trauma >64yo    HPI:   Albetro Eduardo is a 69 y.o. with a medical history of CAD, HTN, HLD, MI, hypothyroidism, s/p AAA repair, bladder CA in 1994 who was admitted on 1/13/2023 from home with a CHIEF COMPLAINT of right-sided headache. Patient reportedly fell 3 days prior to arrival to ED and hit the right side of his chest and head. Patient was evaluated by the [place] and was found to have bilateral subdural hematomas worse on the left with midline shift. Patient was taken to the OR for left posterior frontal taz hole for drainage of subdural hematoma and placement of subdural drain. Patient currently admitted to the ICU. Palliative medicine is consulted for trauma > 64yo. ASSESSMENT/PLAN:     Pertinent Hospital Diagnoses     Bilateral SDH with midline shift  Fall      Palliative Care Encounter / Counseling Regarding Goals of Care  Please see detailed goals of care discussion as below  At this time, Alberto Eduardo, Does have capacity for medical decision-making.   Capacity is time limited and situation/question specific  Outcome of goals of care meeting:   Continue limited code  Continue current management  Code status Limited : no intubation, no compressions, no defibrillation, no resuscitative medications  Advanced Directives: no POA or living will in epic  Surrogate/Legal NOK:  Elijah Michaudd, spouse, [id number], child, Obdulia Whelan, child, [phone]        Spiritual assessment: no spiritual distress identified  Bereavement and grief: to be determined  Referrals to: none today  SUBJECTIVE:     Current medical issues leading to Palliative Medicine involvement include   Active Hospital Problems    Diagnosis Date Noted    SDH (subdural hematoma) [S06. 5XAA] 01/13/2023     Priority: Medium       Details of Conversation: Chart reviewed and patient seen. Patient is sitting up in bed, no family is present at the bedside. Patient is alert and oriented, able to carry on a meaningful medical conversation. Role of palliative medicine explained. Patient states that he has a living will and healthcare power of . Identifies his spouse, Jennifer Oh, as healthcare power of . We discussed goals of care and CODE STATUS. Patient wishes to continue limited code and current management. Patient states that his quality of life was good prior to admission and he is hoping to return home \" as soon as possible\". I did offer to call his spouse to provide an update however he declines at this time. Support offered. We will follow. OBJECTIVE:   Prognosis: depends upon goals and unknown    Physical Exam:  /60   Pulse 63   Temp 98 °F (36.7 °C) (Oral)   Resp 16   Ht 5' 10\" (1.778 m)   Wt 174 lb 12.8 oz (79.3 kg)   SpO2 99%   BMI 25.08 kg/m²   Constitutional:  Elderly, thin, NAD, awake, alert  Lungs:  RA, easy and labored respirations  Heart:  RRR  Abd:  Soft, non tender, non distended  Ext:  Moving all extremities, no edema, pulses present  Skin:  Warm and dry  Neuro:  Alert, grossly nonfocal; following commands    Objective data reviewed: labs, images, records, medication use, vitals, and chart    Discussed patient and the plan of care with the other IDT members: Palliative Medicine IDT Team    Time/Communication  Greater than 50% of time spent, total 55 minutes in counseling and coordination of care at the bedside regarding goals of care, diagnosis and prognosis, and see above. Thank you for allowing Palliative Medicine to participate in the care of [facility].

## 2023-01-16 PROBLEM — I71.40 ABDOMINAL AORTIC ANEURYSM (AAA) WITHOUT RUPTURE (HCC): Status: ACTIVE | Noted: 2023-01-16

## 2023-01-16 LAB
ALBUMIN SERPL-MCNC: 3.4 G/DL (ref 3.5–5.2)
ALP BLD-CCNC: 64 U/L (ref 40–129)
ALT SERPL-CCNC: <5 U/L (ref 0–40)
ANION GAP SERPL CALCULATED.3IONS-SCNC: 11 MMOL/L (ref 7–16)
AST SERPL-CCNC: 12 U/L (ref 0–39)
BASOPHILS ABSOLUTE: 0.05 E9/L (ref 0–0.2)
BASOPHILS RELATIVE PERCENT: 0.6 % (ref 0–2)
BILIRUB SERPL-MCNC: <0.2 MG/DL (ref 0–1.2)
BUN BLDV-MCNC: 45 MG/DL (ref 6–23)
CALCIUM IONIZED: 1.32 MMOL/L (ref 1.15–1.33)
CALCIUM SERPL-MCNC: 8.6 MG/DL (ref 8.6–10.2)
CHLORIDE BLD-SCNC: 106 MMOL/L (ref 98–107)
CO2: 21 MMOL/L (ref 22–29)
CREAT SERPL-MCNC: 1.7 MG/DL (ref 0.7–1.2)
EOSINOPHILS ABSOLUTE: 0.07 E9/L (ref 0.05–0.5)
EOSINOPHILS RELATIVE PERCENT: 0.8 % (ref 0–6)
GFR SERPL CREATININE-BSD FRML MDRD: 41 ML/MIN/1.73
GLUCOSE BLD-MCNC: 103 MG/DL (ref 74–99)
HCT VFR BLD CALC: 39.6 % (ref 37–54)
HEMOGLOBIN: 13.1 G/DL (ref 12.5–16.5)
IMMATURE GRANULOCYTES #: 0.04 E9/L
IMMATURE GRANULOCYTES %: 0.5 % (ref 0–5)
LYMPHOCYTES ABSOLUTE: 1.95 E9/L (ref 1.5–4)
LYMPHOCYTES RELATIVE PERCENT: 22.5 % (ref 20–42)
MAGNESIUM: 2.3 MG/DL (ref 1.6–2.6)
MCH RBC QN AUTO: 30.8 PG (ref 26–35)
MCHC RBC AUTO-ENTMCNC: 33.1 % (ref 32–34.5)
MCV RBC AUTO: 93 FL (ref 80–99.9)
MONOCYTES ABSOLUTE: 0.87 E9/L (ref 0.1–0.95)
MONOCYTES RELATIVE PERCENT: 10 % (ref 2–12)
NEUTROPHILS ABSOLUTE: 5.68 E9/L (ref 1.8–7.3)
NEUTROPHILS RELATIVE PERCENT: 65.6 % (ref 43–80)
PDW BLD-RTO: 13.8 FL (ref 11.5–15)
PHOSPHORUS: 2.6 MG/DL (ref 2.5–4.5)
PLATELET # BLD: 157 E9/L (ref 130–450)
PMV BLD AUTO: 10.2 FL (ref 7–12)
POTASSIUM SERPL-SCNC: 4 MMOL/L (ref 3.5–5)
RBC # BLD: 4.26 E12/L (ref 3.8–5.8)
SODIUM BLD-SCNC: 138 MMOL/L (ref 132–146)
TOTAL PROTEIN: 6.4 G/DL (ref 6.4–8.3)
WBC # BLD: 8.7 E9/L (ref 4.5–11.5)

## 2023-01-16 PROCEDURE — 97530 THERAPEUTIC ACTIVITIES: CPT

## 2023-01-16 PROCEDURE — 6370000000 HC RX 637 (ALT 250 FOR IP): Performed by: STUDENT IN AN ORGANIZED HEALTH CARE EDUCATION/TRAINING PROGRAM

## 2023-01-16 PROCEDURE — 6360000002 HC RX W HCPCS: Performed by: NEUROLOGICAL SURGERY

## 2023-01-16 PROCEDURE — 85025 COMPLETE CBC W/AUTO DIFF WBC: CPT

## 2023-01-16 PROCEDURE — 97162 PT EVAL MOD COMPLEX 30 MIN: CPT

## 2023-01-16 PROCEDURE — 36415 COLL VENOUS BLD VENIPUNCTURE: CPT

## 2023-01-16 PROCEDURE — 83735 ASSAY OF MAGNESIUM: CPT

## 2023-01-16 PROCEDURE — 6370000000 HC RX 637 (ALT 250 FOR IP): Performed by: SURGERY

## 2023-01-16 PROCEDURE — 6360000002 HC RX W HCPCS: Performed by: STUDENT IN AN ORGANIZED HEALTH CARE EDUCATION/TRAINING PROGRAM

## 2023-01-16 PROCEDURE — 80053 COMPREHEN METABOLIC PANEL: CPT

## 2023-01-16 PROCEDURE — 2500000003 HC RX 250 WO HCPCS: Performed by: STUDENT IN AN ORGANIZED HEALTH CARE EDUCATION/TRAINING PROGRAM

## 2023-01-16 PROCEDURE — 2580000003 HC RX 258: Performed by: STUDENT IN AN ORGANIZED HEALTH CARE EDUCATION/TRAINING PROGRAM

## 2023-01-16 PROCEDURE — 2000000000 HC ICU R&B

## 2023-01-16 PROCEDURE — 009430Z DRAINAGE OF INTRACRANIAL SUBDURAL SPACE WITH DRAINAGE DEVICE, PERCUTANEOUS APPROACH: ICD-10-PCS | Performed by: NEUROLOGICAL SURGERY

## 2023-01-16 PROCEDURE — 99232 SBSQ HOSP IP/OBS MODERATE 35: CPT | Performed by: SURGERY

## 2023-01-16 PROCEDURE — 82330 ASSAY OF CALCIUM: CPT

## 2023-01-16 PROCEDURE — 84100 ASSAY OF PHOSPHORUS: CPT

## 2023-01-16 PROCEDURE — 2580000003 HC RX 258: Performed by: NEUROLOGICAL SURGERY

## 2023-01-16 RX ADMIN — LEVETIRACETAM 500 MG: 500 TABLET, FILM COATED ORAL at 09:12

## 2023-01-16 RX ADMIN — SODIUM CHLORIDE, PRESERVATIVE FREE 10 ML: 5 INJECTION INTRAVENOUS at 20:00

## 2023-01-16 RX ADMIN — SERTRALINE 25 MG: 25 TABLET, FILM COATED ORAL at 09:09

## 2023-01-16 RX ADMIN — SODIUM CHLORIDE, PRESERVATIVE FREE 10 ML: 5 INJECTION INTRAVENOUS at 23:32

## 2023-01-16 RX ADMIN — METOPROLOL TARTRATE 50 MG: 50 TABLET, FILM COATED ORAL at 09:11

## 2023-01-16 RX ADMIN — WATER 2000 MG: 1 INJECTION INTRAMUSCULAR; INTRAVENOUS; SUBCUTANEOUS at 09:04

## 2023-01-16 RX ADMIN — SODIUM PHOSPHATE, MONOBASIC, MONOHYDRATE AND SODIUM PHOSPHATE, DIBASIC, ANHYDROUS 15 MMOL: 276; 142 INJECTION, SOLUTION INTRAVENOUS at 09:13

## 2023-01-16 RX ADMIN — WATER 2000 MG: 1 INJECTION INTRAMUSCULAR; INTRAVENOUS; SUBCUTANEOUS at 23:32

## 2023-01-16 RX ADMIN — LEVOTHYROXINE SODIUM 25 MCG: 0.03 TABLET ORAL at 05:38

## 2023-01-16 RX ADMIN — METOPROLOL TARTRATE 50 MG: 50 TABLET, FILM COATED ORAL at 20:00

## 2023-01-16 RX ADMIN — WATER 2000 MG: 1 INJECTION INTRAMUSCULAR; INTRAVENOUS; SUBCUTANEOUS at 16:39

## 2023-01-16 RX ADMIN — SODIUM CHLORIDE, PRESERVATIVE FREE 10 ML: 5 INJECTION INTRAVENOUS at 09:12

## 2023-01-16 RX ADMIN — LEVETIRACETAM 500 MG: 500 TABLET, FILM COATED ORAL at 20:00

## 2023-01-16 RX ADMIN — ONDANSETRON 4 MG: 2 INJECTION INTRAMUSCULAR; INTRAVENOUS at 10:25

## 2023-01-16 RX ADMIN — HYDRALAZINE HYDROCHLORIDE 10 MG: 20 INJECTION INTRAMUSCULAR; INTRAVENOUS at 11:00

## 2023-01-16 RX ADMIN — ACETAMINOPHEN 650 MG: 325 TABLET ORAL at 09:30

## 2023-01-16 RX ADMIN — AMLODIPINE BESYLATE 10 MG: 10 TABLET ORAL at 09:12

## 2023-01-16 RX ADMIN — PRAVASTATIN SODIUM 80 MG: 20 TABLET ORAL at 20:06

## 2023-01-16 ASSESSMENT — PAIN DESCRIPTION - FREQUENCY
FREQUENCY: CONTINUOUS
FREQUENCY: INTERMITTENT

## 2023-01-16 ASSESSMENT — PAIN - FUNCTIONAL ASSESSMENT
PAIN_FUNCTIONAL_ASSESSMENT: PREVENTS OR INTERFERES SOME ACTIVE ACTIVITIES AND ADLS
PAIN_FUNCTIONAL_ASSESSMENT: PREVENTS OR INTERFERES SOME ACTIVE ACTIVITIES AND ADLS

## 2023-01-16 ASSESSMENT — PAIN SCALES - GENERAL
PAINLEVEL_OUTOF10: 0
PAINLEVEL_OUTOF10: 1
PAINLEVEL_OUTOF10: 0
PAINLEVEL_OUTOF10: 3
PAINLEVEL_OUTOF10: 2

## 2023-01-16 ASSESSMENT — PAIN DESCRIPTION - ORIENTATION
ORIENTATION: ANTERIOR
ORIENTATION: OTHER (COMMENT)

## 2023-01-16 ASSESSMENT — PAIN DESCRIPTION - DESCRIPTORS
DESCRIPTORS: OTHER (COMMENT)
DESCRIPTORS: ACHING;DISCOMFORT

## 2023-01-16 ASSESSMENT — PAIN DESCRIPTION - LOCATION
LOCATION: HEAD
LOCATION: HEAD

## 2023-01-16 ASSESSMENT — PAIN DESCRIPTION - ONSET
ONSET: GRADUAL
ONSET: ON-GOING

## 2023-01-16 ASSESSMENT — PAIN DESCRIPTION - PAIN TYPE
TYPE: SURGICAL PAIN
TYPE: ACUTE PAIN

## 2023-01-16 NOTE — PROGRESS NOTES
Physical Therapy  Physical Therapy Initial Assessment     Name: Debbie Sanford  : 1945  MRN: [id number]      Date of Service: 2023    Evaluating PT:  Sarah Rawls PT, DPT [id number]    Room #:  3071/0917-X  Diagnosis:  SDH (subdural hematoma) [S06. 5XAA]  PMHx/PSHx:    Past Medical History:   Diagnosis Date    Abdominal aortic aneurysm     CAD (coronary artery disease)     Cancer ([facility] [address]     bladder s/p resection     History of heart attack     Hyperlipidemia     Hypertension     Hypothyroidism     Prominent abdominal aortic pulse 2021    S/P AAA (abdominal aortic aneurysm) repair 2012     Procedure/Surgery:   Left posterior frontal taz hole for drainage of subdural hematoma, placement of subdural drain. Precautions:  Falls, subdural drain  Equipment Needs:  TBD    SUBJECTIVE:    Pt lives with wife in a split-level home with 1-2 stairs to enter and no rail. 6 steps and 2 rails to basement level where pt resides. Pt ambulated without device and was independent PTA. OBJECTIVE:   Initial Evaluation  Date: 23 Treatment Short Term/ Long Term   Goals   AM-PAC 6 Clicks 58/11     Was pt agreeable to Eval/treatment? Yes     Does pt have pain? 2/10 HA     Bed Mobility  Rolling: NT  Supine to sit: SBA with HOB elevated  Sit to supine: NT  Scooting: SBA  Mod Independent   Transfers Sit to stand: Sabrina  Stand to sit: Sabrina  Stand pivot: Sabrina no device  Mod Independent with AAD   Ambulation   60 feet with Sabrina no device  >400 feet with Mod Independent with AAD    Stair negotiation: ascended and descended NT  >6 steps with 1 rail Mod Independent   ROM BUE:  Defer to OT note  BLE:  WFL     Strength BUE:  Defer to OT note  BLE:  4/5 grossly  Increase by 1/3 MMT grade   Balance Sitting EOB:  SBA  Dynamic Standing:  Sabrina no device  Sitting EOB:  Independent  Dynamic Standing:   Mod Independent with AAD     Pt is A & O x 4  CAM-ICU: NT  RASS: 0  Sensation:  no reported paresthesias  Edema: none    Vitals:  Heart Rate at rest 61 bpm Heart Rate post session 66 bpm   SpO2 at rest 97% SpO2 post session 98%   Blood Pressure at rest 144/72 mmHg Blood Pressure post session 152/78 mmHg       Functional Status Score-Intensive Care Unit (FSS-ICU)   Rolling -/7   Supine to sit transfer 5/7   Unsupported sitting  5/7   Sit to stand transfers 4/7   Ambulation 2/7   Total  16/35       Therapeutic Exercises:  NA    Patient education  Pt educated on safety    Patient response to education:   Pt verbalized understanding Pt demonstrated skill Pt requires further education in this area   x x x     ASSESSMENT:    Conditions Requiring Skilled Therapeutic Intervention:    [x]Decreased strength     []Decreased ROM  [x]Decreased functional mobility  [x]Decreased balance   [x]Decreased endurance   [x]Decreased posture  []Decreased sensation  []Decreased coordination   []Decreased vision  []Decreased safety awareness   [x]Increased pain       Comments:  RN reported pt was medically stable. Pt was in bed upon arrival, agreeable to initial evaluation. Pt's family was present for session. Pt initially transferred to chair and reported nausea but no emesis. RN aware and medicated pt. Pt ambulated into hallway with unsteadiness but no LOBs noted. Fatigue and HA limited activity. Pt was left in chair with all needs met and call light in reach. All lines remained intact. RN aware. Discussed possible therapy options at discharge as well as adaptive equipment which may be useful. Treatment:  Patient practiced and was instructed in the following treatment:    Bed mobility training - pt given verbal cues to facilitate proper sequencing and safety during supine>sit.   Sitting EOB for >3 minutes for upright tolerance, postural awareness and BLE ROM  Transfer training - pt was given verbal and tactile cues to facilitate proper hand placement, technique and safety during sit to stand, stand to sit and stand pivot transfers as well as provided with physical assistance. Gait training- pt was given verbal and tactile cues to facilitate safety and balance during ambulation as well as provided with physical assistance. Pt's/ family goals   1. Return home    Prognosis is good for reaching above PT goals. Patient and or family understand(s) diagnosis, prognosis, and plan of care. yes    PHYSICAL THERAPY PLAN OF CARE:    PT POC is established based on physician order and patient diagnosis     Referring provider/PT Order:  Shaylee Zhang MD /01/15/23 0945 PT eval and treat  Diagnosis:  SDH (subdural hematoma) [S06. 5XAA]  Specific instructions for next treatment:  Progress activity    Current Treatment Recommendations:     [x] Strengthening to improve independence with functional mobility   [] ROM to improve independence with functional mobility   [x] Balance Training to improve static/dynamic balance and to reduce fall risk  [x] Endurance Training to improve activity tolerance during functional mobility   [x] Transfer Training to improve safety and independence with all functional transfers   [x] Gait Training to improve gait mechanics, endurance and asses need for appropriate assistive device  [x] Stair Training in preparation for safe discharge home and/or into the community   [] Positioning to prevent skin breakdown and contractures  [x] Safety and Education Training   [x] Patient/Caregiver Education   [] HEP  [] Other     PT long term treatment goals are located in above grid    Frequency of treatments: 2-5x/week x 1-2 weeks. Time in  1004  Time out  1029    Total Treatment Time  10 minutes     Evaluation Time includes thorough review of current medical information, gathering information on past medical history/social history and prior level of function, completion of standardized testing/informal observation of tasks, assessment of data and education on plan of care and goals.     CPT codes:  [] Low Complexity PT evaluation 25234  [x] Moderate Complexity PT evaluation 09541  [] High Complexity PT evaluation [id number]  [] PT Re-evaluation 70810  [] Gait training 50697 - minutes  [] Manual therapy 43095 - minutes  [x] Therapeutic activities 02705 10 minutes  [] Therapeutic exercises 67075 - minutes  [] Neuromuscular reeducation 88247 - minutes     Magaly Torrez, PT, DPT  [id number]

## 2023-01-16 NOTE — PROGRESS NOTES
Palliative Medicine Social Work     Patient Name: Romeo Adamson    Age: 69 y.o. Marital Status:     Win[place] Status: yes    Next of Kin: wife Edelmira Lovell [phone]                  Additional Support: daughter Chanelle Patel, son Nirav     Minor Children: no    Advanced Directives: yes in soft chart, main agent is wife Kevin Fitzgerald. Alternates are daughter Corey Addison and son Nirav    Confirm Code Status: limited no to all    Current Goals of Care: live longer, improve or maintain function/ quality of life, remain at home and continue current management    Mental Health History: no    Substance Abuse:no    Indications of Abuse/Neglect: no    Financial Concerns: no    Living Situation: resides with his wife     Physical Care Needs Met: yes    Emotional Needs Met: time spent exploring pts thoughts and feelings, providing support through active listening and validation of feelings. Assessment: 70 yo  male seen in icu as a geriatric trauma. He had fallen a few days prior to admit, came in with right sided headache, subdural hematomas worse on the left with midline shift. Patient was taken to the OR for left posterior frontal taz hole for drainage of subdural hematoma and placement of subdural drain. Pt is alert, oriented x4 . His wife and daughter are visiting this am. They have received updates and have no questions. He is planning to return home when released.

## 2023-01-16 NOTE — PLAN OF CARE
Problem: Skin/Tissue Integrity  Goal: Absence of new skin breakdown  Description: 1. Monitor for areas of redness and/or skin breakdown  2. Assess vascular access sites hourly  3. Every 4-6 hours minimum:  Change oxygen saturation probe site  4. Every 4-6 hours:  If on nasal continuous positive airway pressure, respiratory therapy assess nares and determine need for appliance change or resting period.   1/16/2023 1045 by Kiara Cutler RN  Outcome: Progressing  1/16/2023 0038 by Delia Seals  Outcome: Progressing     Problem: Safety - Adult  Goal: Free from fall injury  1/16/2023 1045 by Kiara Cutler RN  Outcome: Progressing  1/16/2023 0038 by Delia Seals  Outcome: Progressing     Problem: ABCDS Injury Assessment  Goal: Absence of physical injury  1/16/2023 1045 by Kiara Cutler RN  Outcome: Progressing  1/16/2023 0038 by Delia Seals  Outcome: Progressing     Problem: Pain  Goal: Verbalizes/displays adequate comfort level or baseline comfort level  1/16/2023 1045 by Kiara Cutler RN  Outcome: Progressing  1/16/2023 0038 by Delia Seals  Outcome: Progressing

## 2023-01-16 NOTE — CARE COORDINATION
Case Management Assessment  Initial Evaluation    Date/Time of Evaluation: 1/16/2023 11:11 AM  Assessment Completed by: Markos Dove RN    If patient is discharged prior to next notation, then this note serves as note for discharge by case management. Patient Name: Omar Chung                   YOB: 1945  Diagnosis: SDH (subdural hematoma) [S06. 5XAA]                   Date / Time: 1/13/2023  3:20 PM    Patient Admission Status: Inpatient   Readmission Risk (Low < 19, Mod (19-27), High > 27): Readmission Risk Score: 11.9    Current PCP: Aaron Armando, DO  PCP verified by CM? Yes (Dr Kacy Cowan at the [facility])    Chart Reviewed: Yes      History Provided by: Patient, Child/Family, Spouse  Patient Orientation: Alert and Oriented    Patient Cognition:      Hospitalization in the last 30 days (Readmission):  No    If yes, Readmission Assessment in CM Navigator will be completed. Advance Directives:      Code Status: Limited   Patient's Primary Decision Maker is: Legal Next of Kin (wife)      Discharge Planning:    Patient lives with: Spouse/Significant Other Type of Home: House  Primary Care Giver: Self  Patient Support Systems include: Spouse/Significant Other, Children   Current Financial resources:    Current community resources:    [facility] to [facility]: [facility]            Current DME:              Type of Home Care services:  PT    ADLS  Prior functional level: Independent in ADLs/IADLs  Current functional level: Mobility, Assistance with the following:    PT AM-PAC:   /24  OT AM-PAC:   /24    Family can provide assistance at DC:  (Wife gopal assist as needed)  Would you like Case Management to discuss the discharge plan with any other family members/significant others, and if so, who?     Plans to Return to Present Housing: Yes  Other Identified Issues/Barriers to RETURNING to current housing: none  Potential Assistance needed at discharge: [address] [place] [phone] chair. Wife plans to purchase.)            Potential DME: Shower Chair  Patient expects to discharge to: House  Plan for transportation at discharge: Family    Financial    Payor: MEDICARE / Plan: MEDICARE PART A AND B / Product Type: *No Product type* /     Does insurance require precert for SNF: No    Potential assistance Purchasing Medications: No  Meds-to-Beds request:        Libby Guillory [address] [facility] [address] [place] [facility] [address] [facility] [address]  Phone: [phone] Fax: [address] [place], [place] - [address] [place] [address] - P [phone] Shearon Number [phone]  [address] [place] [address]  [address] [address]  Phone: [phone] Fax: [phone]      Notes:    Factors facilitating achievement of predicted outcomes: Family support    Barriers to discharge: none    Additional Case Management Notes:  Sent from [place] d/t persistent R sided headache. VM left at [facility] with admission date and diagnosis. Dc plan is to return home with wife. Per PT, may recommend hhc depending on his progress. Wife given hhc list. Plan is to use Medicare benefits for hhc if needed. Pt has a cane. Wife plans to purchase shower chair. Informed her of approx cost.    The Plan for Transition of Care is related to the following treatment goals of SDH (subdural hematoma) [S06. 5XAA]    IF APPLICABLE: The Patient and/or patient representative Martita Kwok and his family were provided with a choice of provider and agrees with the discharge plan. Freedom of choice list with basic dialogue that supports the patient's individualized plan of care/goals and shares the quality data associated with the providers was provided to: Patient Representative   Patient Representative Name: wife     The Patient and/or Patient Representative Agree with the Discharge Plan?  Yes    Carole Rivers RN  Case Management Department  Ph: [phone] Fax: [phone]

## 2023-01-16 NOTE — PROGRESS NOTES
SURGICAL INTENSIVE CARE  PROGRESS NOTE    Date of admission:  1/13/2023  Reason for ICU transfer: Acute on chronic SDH    SUBJECTIVE:  No overnight events. Patient feeling well. Tolerating regular diet. Subdural drain to remain in per neurosurgeon until tomorrow. Patient will remain in ICU while drain in place    HOSPITAL COURSE:  1/13: presented as a trauma consult with SDH with midline shift. Admitted to SICU  1/14: went for taz hole with neurosurgery. GCS 15 postop, advanced to clears. 1/15: No acute events overnight. Advanced to regular diet. Cr elevated this morning off fluids, will repeat. 1/16: No overnight events. Patient feeling well. Tolerating regular diet. Subdural drain to remain in per neurosurgeon until tomorrow. Patient will remain in ICU while drain in place         PHYSICAL EXAM:  BP (!) 125/58   Pulse 63   Temp 97.6 °F (36.4 °C) (Oral)   Resp 15   Ht 5' 10\" (1.778 m)   Wt 178 lb 1.6 oz (80.8 kg)   SpO2 96%   BMI 25.55 kg/m²     GENERAL:  NAD. A&Ox3. HEAD:  Normocephalic. Left-sided subdural drain present with sanguinous drainage  EYES:   No scleral icterus. PERRLA. LUNGS:  No increased work of breathing. CTAB. CARDIOVASCULAR: Warm throughout. Regular rate  ABDOMEN:  Soft, non-distended, non-tender. No guarding, rigidity, rebound. R flank ecchymosis  EXTREMITIES:   MAEx4. Atraumatic. No LE edema. 5/5 motor and sensation in all 4 extremities  SKIN:  Warm and dry  NEUROLOGIC:  GCS 15    ASSESSMENT / PLAN:  Neuro: Acute on chronic SDH. Neurosurgeon following. Bur hole with left-sided subdural drain. Continue drain per neurosurgeon, likely removed tomorrow. Home Zoloft. Pain control as needed. Keppra for 7 days  CV: No acute issues. Monitor hemodynamics. Home Norvasc, statin, and metoprolol reordered. Hold metoprolol for heart rate less than 50. Pulm: No acute issues. Incidental pulmonary nodule seen on CT, patient to follow-up with PCP. Encourage IS/SMI.   GI: No acute issues. Bowel regimen. Tolerating regular diet  Renal: CKD, creatinine 1.7, which is baseline. Monitor UOP & Electrolytes. Endocrine: Home Synthroid. Monitor glucose. MSK: R flank ecchymosis, no underlying fracture. Heme: No acute issues.   ID: Ancef per neurosurgeon    Pain/Analgesia: Tylenol  Bowel regimen: GlycoLax  Mouth/eye care: As needed per patient  Ancillary consults: Neurosurgery  Family Update: As available    Disposition: SICU    Electronically signed by Erick Myers DO on 1/16/2023 at 4:37 PM

## 2023-01-16 NOTE — PROGRESS NOTES
Neurosurg progress note  VITALS:  /68   Pulse 71   Temp 98 °F (36.7 °C) (Oral)   Resp 18   Ht 5' 10\" (1.778 m)   Wt 178 lb 1.6 oz (80.8 kg)   SpO2 93%   BMI 25.55 kg/m²   24HR PULSE RANGE: Pulse  Av  Min: 57  Max: 76  CT HEAD WO CONTRAST    Result Date: 2023  EXAMINATION: CT OF THE HEAD WITHOUT CONTRAST; CT OF THE CERVICAL SPINE WITHOUT CONTRAST 2023 3:23 pm TECHNIQUE: CT of the head was performed without the administration of intravenous contrast. Automated exposure control, iterative reconstruction, and/or weight based adjustment of the mA/kV was utilized to reduce the radiation dose to as low as reasonably achievable.; CT of the cervical spine was performed without the administration of intravenous contrast. Multiplanar reformatted images are provided for review. Automated exposure control, iterative reconstruction, and/or weight based adjustment of the mA/kV was utilized to reduce the radiation dose to as low as reasonably achievable. COMPARISON: None. HISTORY: ORDERING SYSTEM PROVIDED HISTORY: trip and fall, hit head no LOC, no thinners TECHNOLOGIST PROVIDED HISTORY: Has a \"code stroke\" or \"stroke alert\" been called? ->No Reason for exam:->trip and fall, hit head no LOC, no thinners Decision Support Exception - unselect if not a suspected or confirmed emergency medical condition->Emergency Medical Condition (MA) What reading provider will be dictating this exam?->CRC FINDINGS: CT head: There are bilateral subdural hemorrhages. These are predominantly isodense to cerebral cortex. The isodense blood could be hyperacute or subacute. There are areas of hyperdensity in both subdural collections, left greater than right. Hyperdense areas are consistent with acute hemorrhage. The left frontoparietal hemorrhage measures approximately 2.6 cm in thickness while the right frontoparietal subdural hemorrhage measures approximately 1.0 cm in greatest thickness.   There is approximately 7 mm of rightward midline shift. There is compression of the anterior horn left lateral ventricle with effacement of the atrium and posterior horn of the left lateral ventricle. There is effacement of the left frontoparietal sulci. There is no evidence of acute cortical ischemia. The calvarium is intact. The visualized portions of the paranasal sinuses and left mastoid air cells are clear. There is small right mastoid effusion. There is mild sclerosis at the inferior aspect of the right mastoid air cells. CT cervical spine: Examination is mildly degraded by motion. However, there is no evidence of acute fracture. There is grade 1 anterolisthesis at C3-4 and C4-5. The remaining alignment is anatomic. There are no compression deformities. There is narrowing of the intervertebral disc space heights at C4-5 through C6-7. There is lucency through the base of a posterior endplate osteophyte in the midline at C4 which appears likely chronic in nature. The facet joints are intact at all levels. Facet hypertrophy is most pronounced on the left at C3-4 and on the right at C4-5. The prevertebral soft tissue structures are unremarkable. There is multilevel central canal stenosis and neural foraminal narrowing. There is arteriosclerosis. 1. Bilateral mixed subacute and acute subdural hemorrhage, left greater than right. There is approximately 7 mm of rightward midline shift. No evidence of transtentorial herniation. 2. No acute fracture or subluxation within the cervical spine. 3. Degenerative disc disease and facet arthropathy in the cervical spine with multilevel central canal stenosis and neural foraminal narrowing. Findings discussed with Nohemi Steele via telephone on 01/13/2023 at 1610 hours [facility] Standard time.      CT CHEST W CONTRAST    Result Date: 1/13/2023  EXAMINATION: CT OF THE CHEST WITH CONTRAST 1/13/2023 7:44 pm TECHNIQUE: CT of the chest was performed with the administration of intravenous contrast. Multiplanar reformatted images are provided for review. Automated exposure control, iterative reconstruction, and/or weight based adjustment of the mA/kV was utilized to reduce the radiation dose to as low as reasonably achievable. COMPARISON: August 16, 2019 HISTORY: ORDERING SYSTEM PROVIDED HISTORY: trauma TECHNOLOGIST PROVIDED HISTORY: Reason for exam:->trauma What reading provider will be dictating this exam?->CRC FINDINGS: The heart is normal in size. No pericardial effusion. Atherosclerotic calcifications are associated with the coronary arteries. No abnormal mediastinal fluid collections. Atherosclerotic plaque associated with the thoracic aorta. Descending thoracic aorta is ectatic measuring up to 3.8 cm. No airspace opacity or pleural effusion. Redemonstration of opacities in peripheral aspect of the superior segment of left lower lobe related to scarring or subsegmental atelectasis. Stable nodule located in lingula measures 5 mm. Stable nodule located in anterior aspect of right middle lobe measures approximately 5 mm. There are expanded airspaces in the upper lobes with multiple bulla and blebs. No pneumothorax. Stable focus of increased attenuation associated with musculature along anterior margin of right scapula measuring up to 8 mm. No evidence of right or left-sided rib fracture. 1. No acute process in the chest. 2. Emphysematous changes. 3. Redemonstration of opacities in peripheral aspect of left lower lobe which could indicate stable scarring or subsegmental atelectasis 4. Redemonstration of stable nodules bilaterally. Follow-up CT could be helpful based on patient risk factors.      CT CERVICAL SPINE WO CONTRAST    Result Date: 1/13/2023  EXAMINATION: CT OF THE HEAD WITHOUT CONTRAST; CT OF THE CERVICAL SPINE WITHOUT CONTRAST 1/13/2023 3:23 pm TECHNIQUE: CT of the head was performed without the administration of intravenous contrast. Automated exposure control, iterative reconstruction, and/or weight based adjustment of the mA/kV was utilized to reduce the radiation dose to as low as reasonably achievable.; CT of the cervical spine was performed without the administration of intravenous contrast. Multiplanar reformatted images are provided for review. Automated exposure control, iterative reconstruction, and/or weight based adjustment of the mA/kV was utilized to reduce the radiation dose to as low as reasonably achievable. COMPARISON: None. HISTORY: ORDERING SYSTEM PROVIDED HISTORY: trip and fall, hit head no LOC, no thinners TECHNOLOGIST PROVIDED HISTORY: Has a \"code stroke\" or \"stroke alert\" been called? ->No Reason for exam:->trip and fall, hit head no LOC, no thinners Decision Support Exception - unselect if not a suspected or confirmed emergency medical condition->Emergency Medical Condition (MA) What reading provider will be dictating this exam?->CRC FINDINGS: CT head: There are bilateral subdural hemorrhages. These are predominantly isodense to cerebral cortex. The isodense blood could be hyperacute or subacute. There are areas of hyperdensity in both subdural collections, left greater than right. Hyperdense areas are consistent with acute hemorrhage. The left frontoparietal hemorrhage measures approximately 2.6 cm in thickness while the right frontoparietal subdural hemorrhage measures approximately 1.0 cm in greatest thickness. There is approximately 7 mm of rightward midline shift. There is compression of the anterior horn left lateral ventricle with effacement of the atrium and posterior horn of the left lateral ventricle. There is effacement of the left frontoparietal sulci. There is no evidence of acute cortical ischemia. The calvarium is intact. The visualized portions of the paranasal sinuses and left mastoid air cells are clear. There is small right mastoid effusion. There is mild sclerosis at the inferior aspect of the right mastoid air cells.  CT cervical spine: Examination is mildly degraded by motion. However, there is no evidence of acute fracture. There is grade 1 anterolisthesis at C3-4 and C4-5. The remaining alignment is anatomic. There are no compression deformities. There is narrowing of the intervertebral disc space heights at C4-5 through C6-7. There is lucency through the base of a posterior endplate osteophyte in the midline at C4 which appears likely chronic in nature. The facet joints are intact at all levels. Facet hypertrophy is most pronounced on the left at C3-4 and on the right at C4-5. The prevertebral soft tissue structures are unremarkable. There is multilevel central canal stenosis and neural foraminal narrowing. There is arteriosclerosis. 1. Bilateral mixed subacute and acute subdural hemorrhage, left greater than right. There is approximately 7 mm of rightward midline shift. No evidence of transtentorial herniation. 2. No acute fracture or subluxation within the cervical spine. 3. Degenerative disc disease and facet arthropathy in the cervical spine with multilevel central canal stenosis and neural foraminal narrowing. Findings discussed with Shaunna Howard via telephone on 01/13/2023 at 1610 hours [facility] Standard time. CT ABDOMEN PELVIS W IV CONTRAST Additional Contrast? None    Result Date: 1/13/2023  EXAMINATION: CT OF THE ABDOMEN AND PELVIS WITH CONTRAST 1/13/2023 7:44 pm TECHNIQUE: CT of the abdomen and pelvis was performed with the administration of intravenous contrast. Multiplanar reformatted images are provided for review. Automated exposure control, iterative reconstruction, and/or weight based adjustment of the mA/kV was utilized to reduce the radiation dose to as low as reasonably achievable.  COMPARISON: November 18, 2007 HISTORY: ORDERING SYSTEM PROVIDED HISTORY: trauma TECHNOLOGIST PROVIDED HISTORY: Additional Contrast?->None Reason for exam:->trauma What reading provider will be dictating this exam?->CRC FINDINGS: Evidence of prior surgery and aortic repair. There is a fusiform infrarenal abdominal aortic aneurysm measuring up to 3.2 cm appearing slightly larger compared to prior. There is aneurysmal dilatation involving right and left common iliac arteries measuring up to 1.8 cm on the right and 1.9 cm on the left. No retroperitoneal fluid collections. No free air or free fluid. There are numerous diverticula associated with the sigmoid colon. The appendix is visualized and appears unremarkable. No intrahepatic bile duct dilatation. Cyst or hemangioma associated with right hepatic lobe measuring up to 8 mm. Multiple gallstones present in the gallbladder. No evidence of acute pancreatitis. Spleen is normal in size. Adrenal glands are normal. No hydronephrosis. No perinephric edema. Urinary bladder is unremarkable. No hip fracture or dislocation. No pelvis fracture. No evidence of compression fracture involving the lumbar spine. 1. No acute process in abdomen or pelvis. 2. Diverticulosis. 3. Cholelithiasis. 4. Infrarenal abdominal aortic aneurysm measures up to 3.2 cm appearing slightly larger compared to prior from November 18, 2007. XR CHEST PORTABLE    Result Date: 1/13/2023  EXAMINATION: ONE XRAY VIEW OF THE CHEST 1/13/2023 4:57 pm COMPARISON: Chest, single view 11/18/2007 HISTORY: ORDERING SYSTEM PROVIDED HISTORY: Shortness of breath TECHNOLOGIST PROVIDED HISTORY: Reason for exam:->Shortness of breath What reading provider will be dictating this exam?->CRC FINDINGS: Single portable AP view of the chest was obtained. Heart size likely within normal limits. Mediastinal contour and pulmonary vascularity are within normal limits. There is biapical pleuroparenchymal scarring. There is diffuse prominence of the pulmonary interstitium. There is stable blunting of the left lateral costophrenic angle. No acute cardiopulmonary disease.      CBC:   Lab Results   Component Value Date/Time    WBC 8.7 01/16/2023 05:45 AM    RBC 4.26 01/16/2023 05:45 AM    HGB 13.1 01/16/2023 05:45 AM    HCT 39.6 01/16/2023 05:45 AM    MCV 93.0 01/16/2023 05:45 AM    MCH 30.8 01/16/2023 05:45 AM    MCHC 33.1 01/16/2023 05:45 AM    RDW 13.8 01/16/2023 05:45 AM     01/16/2023 05:45 AM    MPV 10.2 01/16/2023 05:45 AM     BMP:    Lab Results   Component Value Date/Time     01/16/2023 05:45 AM    K 4.0 01/16/2023 05:45 AM    K 3.7 01/15/2023 02:25 PM     01/16/2023 05:45 AM    CO2 21 01/16/2023 05:45 AM    BUN 45 01/16/2023 05:45 AM    LABALBU 3.4 01/16/2023 05:45 AM    CREATININE 1.7 01/16/2023 05:45 AM    CALCIUM 8.6 01/16/2023 05:45 AM    GFRAA 45 03/24/2022 04:00 AM    LABGLOM 41 01/16/2023 05:45 AM    GLUCOSE 103 01/16/2023 05:45 AM      sodium phosphate IVPB  15 mmol IntraVENous Once    ceFAZolin  2,000 mg IntraVENous Q8H    levothyroxine  25 mcg Oral Daily    sertraline  25 mg Oral Daily    sodium chloride flush  5-40 mL IntraVENous 2 times per day    polyethylene glycol  17 g Oral BID    levETIRAcetam  500 mg Oral BID    amLODIPine  10 mg Oral Daily    metoprolol tartrate  50 mg Oral BID    pravastatin  80 mg Oral Nightly       Assessment:  Patient Active Problem List   Diagnosis    S/P AAA (abdominal aortic aneurysm) repair    Prominent abdominal aortic pulse    History of tobacco use    SDH (subdural hematoma)    Palliative care encounter    Abdominal aortic aneurysm (AAA) without rupture     Plan:  We will likely DC drain tomorrow continue current care  Faviola Yap MD M.D.

## 2023-01-16 NOTE — DISCHARGE INSTRUCTIONS
TRAUMA SERVICES DISCHARGE INSTRUCTIONS    Call [phone], option 2, for any questions/concerns and for follow-up appointment in 2 week(s) after rehab discharge. Please follow the instructions checked below:    During the course of your workup, we identified an incidental finding of:  abdominal ortic aneurysm. Please follow-up with your primary care provider. ACTIVITY INSTRUCTIONS  Increase activity as tolerated  No heavy lifting or strenuous activity  Take your incentive spirometer home and use 4-6 times/day   [x]  No driving until cleared by Neurosurery    WOUND/DRESSING INSTRUCTIONS:  You may shower. No sitting in bath tub, hot tub or swimming until cleared by physician. Ice to areas of pain for first 24 hours. Heat to areas of pain after that. Wash areas of lacerations/abrasions with soap & water. Rinse well. Pat dry with clean towel. Apply thin layer of Bacitracin, Neosporin, or triple antibiotic cream to affected area 2-3 times per day. Keep wounds clean and dry. []  Sutures/Staples are to be removed in 2  week(s). CT scan prior to Dr Vanessa Masterson appointment. MEDICATION INSTRUCTIONS  Take medication as prescribed. When taking pain medications, you may experience dizziness or drowsiness. Do not drink alcohol or drive when taking these medications. You may experience constipation while taking pain medication. You may take over the counter stool softeners such as docusate (Colace), sennosides S (Senokot-S), or Miralax. [x]  You may take acetaminophen (Tylenol) products. Do NOT take more than 4000mg of Tylenol in 24h. OPIOID MEDICATION INSTRUCTIONS  Read the medication guide that is included with your prescription. Take your medication exactly as prescribed. Store medication away from children and in a safe place. Do NOT share your medication with others. Do NOT take medication unless it is prescribed for you.   Do NOT drink alcohol while taking opioids (I.e., Norco, Percocet, Oxycodone, etc). Discuss with the Trauma Clinic staff if the dose of medication you are taking does not control your pain and any side effects that you may be having. CALL 911 OR YOUR LOCAL EMERGENCY SERVICE:  --If you take too much medication  --If you have trouble breathing or shortness of breath  --A child has taken this medication. WORK:  You may not return to work until you receive follow-up with the Trauma Clinic or clearance by all consultants. Call the trauma clinic for any of the following or for questions/concerns;  --fever over 101F  --redness, swelling, hardness or warmth at the wound site(s). --Unrelieved nausea/vomiting  --Foul smelling or cloudy drainage at the wound site(s)  --Unrelieved pain or increase in pain  --Increase in shortness of breath    Follow-up:  Trauma Clinic: [phone] option [address]      SPECIAL CONSIDERATIONS FOR OUR PATIENTS OVER THE AGE OF 65Y    Getting around your home safely can be a challenge if you have injuries or health problems that make it easy for you to fall. Loose rugs and furniture in walkways are among the dangers for many older people who have problems walking or who have poor eyesight. People who have conditions such as arthritis, osteoporosis, or dementia also must be careful not to fall. You can make your home safer with a few simple measures. Follow-up care is a key part of your treatment and safety. Be sure to make and go to all appointments, and call your doctor or nurse call line if you are having problems. It's also a good idea to know your test results and keep a list of the medicines you take. How can you care for yourself at home? Taking care of yourself  You may get dizzy if you do not drink enough water. To prevent dehydration, drink plenty of fluids, enough so that your urine is light yellow or clear like water.  Choose water and other caffeine-free clear liquids. If you have kidney, heart, or liver disease and have to limit fluids, talk with your doctor before you increase the amount of fluids you drink. Exercise regularly to improve your strength, muscle tone, and balance. Walk if you can. Swimming may be a good choice if you cannot walk easily. Have your vision and hearing checked each year or any time you notice a change. If you have trouble seeing and hearing, you might not be able to avoid objects and could lose your balance. Know the side effects of the medicines you take. Ask your doctor or pharmacist whether the medicines you take can affect your balance. Sleeping pills or sedatives can affect your balance. Limit the amount of alcohol you drink. Alcohol can impair your balance and other senses. Ask your doctor whether calluses or corns on your feet need to be removed. If you wear loose-fitting shoes because of calluses or corns, you can lose your balance and fall. Talk to your doctor if you have numbness in your feet. Preventing falls at home  Remove raised doorway thresholds, throw rugs, and clutter. Repair loose carpet or raised areas in the floor. Move furniture and electrical cords to keep them out of walking paths. Use non-skid floor wax, and wipe up spills right away, especially on ceramic tile floors. If you use a walker or cane, put rubber tips on it. If you use crutches, clean the bottoms of them regularly with an abrasive pad, such as steel wool. Keep your house well lit, especially stairways, porches, and outside walkways. Use night-lights in areas such as hallways and washrooms. Add extra light switches or use remote switches (such as switches that go on or off when you clap your hands) to make it easier to turn lights on if you have to get up during the night. Install sturdy handrails on stairways. Move items in your cabinets so that the things you use a lot are on the lower shelves (about waist level).   Keep a cordless phone and a flashlight with new batteries by your bed. If possible, put a phone in each of the main rooms of your house, or carry a cell phone in case you fall and cannot reach a phone. Or, you can wear a device around your neck or wrist. You push a button that sends a signal for help. Wear low-heeled shoes that fit well and give your feet good support. Use footwear with non-skid soles. Check the heels and soles of your shoes for wear. Repair or replace worn heels or soles. Do not wear socks without shoes on wood floors. Walk on the grass when the sidewalks are slippery. If you live in an area that gets snow and ice in the winter, sprinkle salt on slippery steps and sidewalks. Preventing falls in the bath  Install grab bars and non-skid mats inside and outside your shower or tub and near the toilet and sinks. Use shower chairs and bath benches. Use a hand-held shower head that will allow you to sit while showering. Get into a tub or shower by putting the weaker leg in first. Get out of a tub or shower with your strong side first.  Repair loose toilet seats and consider installing a raised toilet seat to make getting on and off the toilet easier. Keep your washroom door unlocked while you are in the shower.     Follow-up:  Trauma Clinic: [phone] option 2  [address] , [address] [place]          DO NOT TAKE YOUR ASPIRIN UNTIL CLEARED BY NEUROSURGEON

## 2023-01-17 LAB
ALBUMIN SERPL-MCNC: 3.2 G/DL (ref 3.5–5.2)
ALP BLD-CCNC: 61 U/L (ref 40–129)
ALT SERPL-CCNC: <5 U/L (ref 0–40)
ANION GAP SERPL CALCULATED.3IONS-SCNC: 12 MMOL/L (ref 7–16)
AST SERPL-CCNC: 12 U/L (ref 0–39)
BASOPHILS ABSOLUTE: 0.07 E9/L (ref 0–0.2)
BASOPHILS RELATIVE PERCENT: 0.9 % (ref 0–2)
BILIRUB SERPL-MCNC: <0.2 MG/DL (ref 0–1.2)
BUN BLDV-MCNC: 35 MG/DL (ref 6–23)
CALCIUM IONIZED: 1.32 MMOL/L (ref 1.15–1.33)
CALCIUM SERPL-MCNC: 8.7 MG/DL (ref 8.6–10.2)
CHLORIDE BLD-SCNC: 108 MMOL/L (ref 98–107)
CO2: 21 MMOL/L (ref 22–29)
CREAT SERPL-MCNC: 1.6 MG/DL (ref 0.7–1.2)
EOSINOPHILS ABSOLUTE: 0.15 E9/L (ref 0.05–0.5)
EOSINOPHILS RELATIVE PERCENT: 1.9 % (ref 0–6)
GFR SERPL CREATININE-BSD FRML MDRD: 44 ML/MIN/1.73
GLUCOSE BLD-MCNC: 95 MG/DL (ref 74–99)
HCT VFR BLD CALC: 38.9 % (ref 37–54)
HEMOGLOBIN: 12.8 G/DL (ref 12.5–16.5)
IMMATURE GRANULOCYTES #: 0.02 E9/L
IMMATURE GRANULOCYTES %: 0.3 % (ref 0–5)
LYMPHOCYTES ABSOLUTE: 1.72 E9/L (ref 1.5–4)
LYMPHOCYTES RELATIVE PERCENT: 21.6 % (ref 20–42)
MAGNESIUM: 2.2 MG/DL (ref 1.6–2.6)
MCH RBC QN AUTO: 30.3 PG (ref 26–35)
MCHC RBC AUTO-ENTMCNC: 32.9 % (ref 32–34.5)
MCV RBC AUTO: 92 FL (ref 80–99.9)
MONOCYTES ABSOLUTE: 0.89 E9/L (ref 0.1–0.95)
MONOCYTES RELATIVE PERCENT: 11.2 % (ref 2–12)
NEUTROPHILS ABSOLUTE: 5.11 E9/L (ref 1.8–7.3)
NEUTROPHILS RELATIVE PERCENT: 64.1 % (ref 43–80)
PDW BLD-RTO: 14.2 FL (ref 11.5–15)
PHOSPHORUS: 3.2 MG/DL (ref 2.5–4.5)
PLATELET # BLD: 146 E9/L (ref 130–450)
PMV BLD AUTO: 10.2 FL (ref 7–12)
POTASSIUM SERPL-SCNC: 4 MMOL/L (ref 3.5–5)
RBC # BLD: 4.23 E12/L (ref 3.8–5.8)
SODIUM BLD-SCNC: 141 MMOL/L (ref 132–146)
TOTAL PROTEIN: 6.2 G/DL (ref 6.4–8.3)
WBC # BLD: 8 E9/L (ref 4.5–11.5)

## 2023-01-17 PROCEDURE — 6370000000 HC RX 637 (ALT 250 FOR IP): Performed by: STUDENT IN AN ORGANIZED HEALTH CARE EDUCATION/TRAINING PROGRAM

## 2023-01-17 PROCEDURE — 99232 SBSQ HOSP IP/OBS MODERATE 35: CPT | Performed by: SURGERY

## 2023-01-17 PROCEDURE — 6370000000 HC RX 637 (ALT 250 FOR IP): Performed by: SURGERY

## 2023-01-17 PROCEDURE — 2580000003 HC RX 258: Performed by: NEUROLOGICAL SURGERY

## 2023-01-17 PROCEDURE — 82330 ASSAY OF CALCIUM: CPT

## 2023-01-17 PROCEDURE — 84100 ASSAY OF PHOSPHORUS: CPT

## 2023-01-17 PROCEDURE — 83735 ASSAY OF MAGNESIUM: CPT

## 2023-01-17 PROCEDURE — 97530 THERAPEUTIC ACTIVITIES: CPT

## 2023-01-17 PROCEDURE — 97166 OT EVAL MOD COMPLEX 45 MIN: CPT

## 2023-01-17 PROCEDURE — 6360000002 HC RX W HCPCS: Performed by: NEUROLOGICAL SURGERY

## 2023-01-17 PROCEDURE — 80053 COMPREHEN METABOLIC PANEL: CPT

## 2023-01-17 PROCEDURE — 2000000000 HC ICU R&B

## 2023-01-17 PROCEDURE — 2580000003 HC RX 258: Performed by: STUDENT IN AN ORGANIZED HEALTH CARE EDUCATION/TRAINING PROGRAM

## 2023-01-17 PROCEDURE — 36415 COLL VENOUS BLD VENIPUNCTURE: CPT

## 2023-01-17 PROCEDURE — 85025 COMPLETE CBC W/AUTO DIFF WBC: CPT

## 2023-01-17 RX ORDER — BACITRACIN ZINC 500 [USP'U]/G
OINTMENT TOPICAL ONCE
Status: COMPLETED | OUTPATIENT
Start: 2023-01-17 | End: 2023-01-17

## 2023-01-17 RX ADMIN — METOPROLOL TARTRATE 50 MG: 50 TABLET, FILM COATED ORAL at 08:05

## 2023-01-17 RX ADMIN — SODIUM CHLORIDE, PRESERVATIVE FREE 10 ML: 5 INJECTION INTRAVENOUS at 21:05

## 2023-01-17 RX ADMIN — AMLODIPINE BESYLATE 10 MG: 10 TABLET ORAL at 08:05

## 2023-01-17 RX ADMIN — PRAVASTATIN SODIUM 80 MG: 20 TABLET ORAL at 21:04

## 2023-01-17 RX ADMIN — METOPROLOL TARTRATE 50 MG: 50 TABLET, FILM COATED ORAL at 21:04

## 2023-01-17 RX ADMIN — LEVETIRACETAM 500 MG: 500 TABLET, FILM COATED ORAL at 21:04

## 2023-01-17 RX ADMIN — LEVOTHYROXINE SODIUM 25 MCG: 0.03 TABLET ORAL at 05:26

## 2023-01-17 RX ADMIN — LEVETIRACETAM 500 MG: 500 TABLET, FILM COATED ORAL at 08:05

## 2023-01-17 RX ADMIN — SERTRALINE 25 MG: 25 TABLET, FILM COATED ORAL at 08:04

## 2023-01-17 RX ADMIN — POLYETHYLENE GLYCOL 3350 17 G: 17 POWDER, FOR SOLUTION ORAL at 08:05

## 2023-01-17 RX ADMIN — SODIUM CHLORIDE, PRESERVATIVE FREE 10 ML: 5 INJECTION INTRAVENOUS at 08:04

## 2023-01-17 RX ADMIN — BACITRACIN ZINC: 500 OINTMENT TOPICAL at 09:45

## 2023-01-17 RX ADMIN — WATER 2000 MG: 1 INJECTION INTRAMUSCULAR; INTRAVENOUS; SUBCUTANEOUS at 08:06

## 2023-01-17 ASSESSMENT — PAIN SCALES - GENERAL
PAINLEVEL_OUTOF10: 0

## 2023-01-17 NOTE — PROGRESS NOTES
Physician Progress Note      PATIENT:               Amos Byrnes  CSN #:                  [id number]  :                       1945  ADMIT DATE:       2023 3:20 PM  DISCH DATE:  RESPONDING  PROVIDER #:        [place]        QUERY TEXT:    Stage of Chronic Kidney Disease: Please provide further specificity, if known. Clinical indicators include: cr, ckd, creatinine  Options provided:  -- Chronic kidney disease stage 1  -- Chronic kidney disease stage 2  -- Chronic kidney disease stage 3  -- Chronic kidney disease stage 3a  -- Chronic kidney disease stage 3b  -- Chronic kidney disease stage 4  -- Chronic kidney disease stage 5  -- Chronic kidney disease stage 5, requiring dialysis  -- End stage renal disease  -- Other - I will add my own diagnosis  -- Disagree - Not applicable / Not valid  -- Disagree - Clinically Unable to determine / Unknown        PROVIDER RESPONSE TEXT:    Provider was unable to determine a response for this query.       Electronically signed by:  Gordon Dc 2023 12:44 PM

## 2023-01-17 NOTE — PROGRESS NOTES
Left sided subdural drain removed at bedside. Bacitracin ointment and occlusive dressing applied. Minimal sanguinous drainage.  The patient tolerated drain removal without issue    Electronically signed by Yola Virk DO on 1/17/2023 at 9:42 AM

## 2023-01-17 NOTE — PROGRESS NOTES
Physical Therapy  Physical Therapy Treatment Note    Name: Mya Palacio  : 1945  MRN: [id number]      Date of Service: 2023    Evaluating PT:  Colin Figueredo, PT, DPT [id number]    Room #:  8284/6954-P  Diagnosis:  SDH (subdural hematoma) [S06. 5XAA]  PMHx/PSHx:    Past Medical History:   Diagnosis Date    Abdominal aortic aneurysm     CAD (coronary artery disease)     Cancer ([facility] [address]     bladder s/p resection     History of heart attack     Hyperlipidemia     Hypertension     Hypothyroidism     Prominent abdominal aortic pulse 2021    S/P AAA (abdominal aortic aneurysm) repair 2012     Procedure/Surgery:   Left posterior frontal taz hole for drainage of subdural hematoma, placement of subdural drain. Precautions:  Falls, subdural drain  Equipment Needs:  TBD    SUBJECTIVE:    Pt lives with wife in a split-level home with 1-2 stairs to enter and no rail. 6 steps and 2 rails to basement level where pt resides. Pt ambulated without device and was independent PTA. OBJECTIVE:   Initial Evaluation  Date: 23 Treatment  23 Short Term/ Long Term   Goals   AM-PAC 6 Clicks  49/27    Was pt agreeable to Eval/treatment? Yes Yes    Does pt have pain?  2/10 HA No c/o pain    Bed Mobility  Rolling: NT  Supine to sit: SBA with HOB elevated  Sit to supine: NT  Scooting: SBA Rolling: NT  Supine to sit: SBA with HOB elevated  Sit to supine: NT  Scooting: SBA Mod Independent   Transfers Sit to stand: Sabrina  Stand to sit: Sabrina  Stand pivot: Sabrina no device Sit to stand: SBA  Stand to sit: SBA  Stand pivot: SBA no device Mod Independent with AAD   Ambulation   60 feet with Sabrina no device 300 feet with SBA no device >400 feet with Mod Independent with AAD    Stair negotiation: ascended and descended NT 15 steps with 1 rail and SBA >6 steps with 1 rail Mod Independent   ROM BUE:  Defer to OT note  BLE:  WFL     Strength BUE:  Defer to OT note  BLE:  4/5 grossly  Increase by 1/3 MMT grade Balance Sitting EOB:  SBA  Dynamic Standing:  Sabrina no device Sitting EOB:  Independent  Dynamic Standing:  SBA no device Sitting EOB:  Independent  Dynamic Standing: Mod Independent with AAD     Pt is A & O x 4  CAM-ICU: NT  RASS: 0  Sensation:  no reported paresthesias  Edema:  none    Vitals:  Heart Rate at rest 67 bpm Heart Rate post session 79 bpm   SpO2 at rest 100% SpO2 post session 100%   Blood Pressure at rest 131/83 mmHg Blood Pressure post session 160/84 mmHg       Functional Status Score-Intensive Care Unit (FSS-ICU)   Rolling -/7   Supine to sit transfer 5/7   Unsupported sitting  5/7   Sit to stand transfers 5/7   Ambulation 5/7   Total  20/35       Therapeutic Exercises:  NA    Patient education  Pt educated on safety    Patient response to education:   Pt verbalized understanding Pt demonstrated skill Pt requires further education in this area   x x x     ASSESSMENT:    Conditions Requiring Skilled Therapeutic Intervention:    [x]Decreased strength     []Decreased ROM  [x]Decreased functional mobility  [x]Decreased balance   [x]Decreased endurance   [x]Decreased posture  []Decreased sensation  []Decreased coordination   []Decreased vision  []Decreased safety awareness   [x]Increased pain       Comments:  RN reported pt was medically stable. Pt was in bed upon arrival, agreeable to treatment session. Pt's family was present for sessions and all questions were answered. Pt demonstrated improved mobility this session. Slight unsteadiness still noted with ambulation but no LOBs. Reciprocal pattern used to ascend steps, non-reciprocal pattern used to descend. Pt was left in chair with all needs met and call light in reach. Treatment:  Patient practiced and was instructed in the following treatment:    Bed mobility training - pt given verbal cues to facilitate proper sequencing and safety during supine>sit.   Sitting EOB for >3 minutes for upright tolerance, postural awareness and BLE ROM  Transfer training - pt was given verbal cues to facilitate proper hand placement, technique and safety during sit to stand, stand to sit and stand pivot transfers. Gait training- pt was given verbal cues to facilitate safety and balance during ambulation. Stair training - pt was given verbal cues to facilitate safety during stair negotiation. PLAN:    Patient is making good progress towards established goals. Will continue with current POC.       Time in  1005  Time out  1030    Total Treatment Time  25 minutes     CPT codes:  [] Gait training 00167 - minutes  [] Manual therapy 66419 - minutes  [x] Therapeutic activities 23324 25 minutes  [] Therapeutic exercises 37956 - minutes  [] Neuromuscular reeducation 36119 - minutes    Kiera Landa PT, DPT  [id number]

## 2023-01-17 NOTE — PROGRESS NOTES
Chart reviewed. Plan is for patient to continue limited code and current medical management. There are no further PM needs at this time. PM will now sign off. If new PM needs arise, please re-consult. Thank you.       [facility] Radha, APRN - CNP  Palliative Medicine

## 2023-01-17 NOTE — PROGRESS NOTES
SURGICAL INTENSIVE CARE  PROGRESS NOTE    Date of admission:  1/13/2023  Reason for ICU transfer: Acute on chronic SDH    SUBJECTIVE:  No overnight events. Patient feeling well. Tolerating regular diet. Subdural drain to remain in per neurosurgeon until tomorrow. Patient will remain in ICU while drain in place    HOSPITAL COURSE:  1/13: presented as a trauma consult with SDH with midline shift. Admitted to SICU  1/14: went for taz hole with neurosurgery. GCS 15 postop, advanced to clears. 1/15: No acute events overnight. Advanced to regular diet. Cr elevated this morning off fluids, will repeat. 1/16: No overnight events. Patient feeling well. Tolerating regular diet. Subdural drain to remain in per neurosurgeon until tomorrow. Patient will remain in ICU while drain in place. [facility] 16/24 1/17: no new events or complaints. Tolerating regular diet. Will transfer out of unit once subdural drain has been removed by neurosurgeon         PHYSICAL EXAM:  /71   Pulse 65   Temp 98 °F (36.7 °C) (Oral)   Resp 14   Ht 5' 10\" (1.778 m)   Wt 178 lb 1.6 oz (80.8 kg)   SpO2 97%   BMI 25.55 kg/m²     GENERAL:  NAD. A&Ox3. HEAD:  Normocephalic. Left-sided subdural drain present with sanguinous drainage  EYES:   No scleral icterus. PERRLA. LUNGS:  No increased work of breathing. CTAB. CARDIOVASCULAR: Warm throughout. Regular rate  ABDOMEN:  Soft, non-distended, non-tender. No guarding, rigidity, rebound. R flank ecchymosis  EXTREMITIES:   MAEx4. Atraumatic. No LE edema. 5/5 motor and sensation in all 4 extremities  SKIN:  Warm and dry  NEUROLOGIC:  GCS 15    ASSESSMENT / PLAN:  Neuro: Acute on chronic SDH. Neurosurgeon following. Raymond hole with left-sided subdural drain. Continue drain per neurosurgeon, likely removed tomorrow. Home Zoloft. Pain control as needed. Keppra for 7 days  CV: No acute issues. Monitor hemodynamics. Home Norvasc, statin, and metoprolol reordered.   Hold metoprolol for heart rate less than 50. Pulm: No acute issues. Incidental pulmonary nodule seen on CT, patient to follow-up with PCP. Encourage IS/SMI. GI: No acute issues. Bowel regimen. Tolerating regular diet  Renal: CKD, creatinine 1.7, which is baseline. Monitor UOP & Electrolytes. Endocrine: Home Synthroid. Monitor glucose. MSK: R flank ecchymosis, no underlying fracture. [facility] 16/24  Heme: No acute issues.   ID: Ancef per neurosurgeon    Pain/Analgesia: Tylenol  Bowel regimen: GlycoLax  Mouth/eye care: As needed per patient  Ancillary consults: Neurosurgery  Family Update: As available    Disposition: SICU    Electronically signed by Dimitris Black DO on 1/17/2023 at 7:51 AM

## 2023-01-17 NOTE — PLAN OF CARE
Problem: Discharge Planning  Goal: Discharge to home or other facility with appropriate resources  Outcome: Progressing     Problem: Skin/Tissue Integrity  Goal: Absence of new skin breakdown  Outcome: Progressing     Problem: Safety - Adult  Goal: Free from fall injury  Outcome: Progressing     Problem: ABCDS Injury Assessment  Goal: Absence of physical injury  Outcome: Progressing     Problem: Pain  Goal: Verbalizes/displays adequate comfort level or baseline comfort level  Outcome: Progressing

## 2023-01-17 NOTE — PROGRESS NOTES
[address]  [address] [place] [address]       JBW:                                                               Patient Name: Kojo Sanchez  MRN: [id number]  : 1945  Room: [address] [place] [address]    Evaluating OT: Bernardo Walter OTR/L [id number]    Referring Provider: Robert Jeronimo MD   Specific Provider Orders/Date: OT eval and treat (1/15/23)       Diagnosis: B SDH (subdural hematoma) [S06. 5XAA]        Reason for admission: s/p fall    Surgery/Procedures:  LEFT PARIETAL ZHEN HOLE FOR SUBDURAL DRAIN    Pertinent Medical History:    Past Medical History:   Diagnosis Date    Abdominal aortic aneurysm     CAD (coronary artery disease)     Cancer ([facility] [address]     bladder s/p resection     History of heart attack     Hyperlipidemia     Hypertension     Hypothyroidism     Prominent abdominal aortic pulse 2021    S/P AAA (abdominal aortic aneurysm) repair 2012        *Precautions:  Fall Risk    Assessment of current deficits   [x] Functional mobility  [x]ADLs  [x] Strength               [x]Cognition   [x] Functional transfers   [x] IADLs         [x] Safety Awareness   [x]Endurance   [] Fine Coordination        [x] ROM     [] Vision/perception   []Sensation    []Gross Motor Coordination [x] Balance   [] Delirium                  []Motor Control     [] Communication    OT PLAN OF CARE   OT POC based on physician orders, patient diagnosis and results of clinical assessment.        Frequency/Duration: 1-3 days/wk for 1-2 weeks PRN    Specific OT Treatment to include:   ADL retraining/adapted techniques and AE recommendations to increase functional independence within precautions                    Energy conservation techniques to improve tolerance for selfcare routine   Functional transfer/mobility training/DME recommendations for increased independence, safety and fall prevention Patient/family education to increase safety and functional independence within precautions              Environmental modifications for safe mobility and completion of ADLs                           Cognitive retraining ex's to improve problem solving skills & safe participation in ADLs/IADLs  Sensory re-education techniques to improve extremity awareness, maintain skin integrity and improve hand function                             Visual/Perceptual retraining  to improve body awareness and safety during transfers and ADLs  Therapeutic activity to improve functional performance during ADLs/IADLs                                         Therapeutic exercise to improve tolerance and functional strength for ADLs   Balance retraining exercises/tasks for facilitation of postural control with dynamic challenges during ADLs . Positioning to improve functional independence  Neuromuscular re-education: facilitation of righting/equilibrium reactions, normalization muscle tone/facilitation active functional movement                      Delirium prevention/treatment    Modified Dickey Scale   Score     Description  0             No symptoms  1             No significant disability despite symptoms  2             Slight disability; able to look after own affairs  3             Moderate disability; able to ambulate without assist/ requires assist with ADLs  4             Moderate/Severe disability;requires assist to ambulate/assist with ADLs  5             Severe disability;bedridden/incontinent   6               Score:   4    Recommended Adaptive Equipment: TBA:shower seat as needed     Home Living: Pt lives with wife  in a split level home with 2 step(s) to enter and B rail(s); bed/bath on first floor  Bathroom setup: walk in shower; high commode seat  Equipment owned: shower seat    Prior Level of Function: IND with ADLs;  IND with IADLs. No device for ambulation.    Driving: yes  Occupation: retired     Pain Level: pt c/o 0/10 pain  this session    Cognition: A&O: 4/4    Follows 1-2 step commands appropriately. Memory: good   Comprehension fair+   Problem solving: fair+   Judgement/safety: fair+/good               Communication skills: WFL           Vision: WFL; reports no vision changes               Glasses:reading                                                   Hearing: WFL     RASS: 0  CAM-ICU: (NT) Delirium    UE Assessment:  Hand Dominance: Right [x]  Left []     ROM Strength   RUE  WFL 4/5   LUE WFL 4/5     Sensation: No c/o numbness/tingling in extremities. Tone: WNL   Edema: WFL     Functional Assessment:  AM-PAC Daily Activity Raw Score: 18/24   Initial Eval Status  Date: 1/17/23 Treatment Status  Date: STGs = LTGs  Time frame: 7-14 days   Feeding S; set up                        IND  while seated up in chair to increase activity tolerance        Grooming S; set up                        Marcellus   while standing sink level requiring AD as needed for balance and demonstrating G tolerance      UB dressing/bathing Min A                        Marcellus       LB dressing/bathing Min A                        Marcellus   using AE as needed for safe reach/ energy conservation       Toileting NT                        Marcellus     Bed Mobility  Supine to sit:   SBA    Sit to supine:   NT                        Marcellus  in prep of ADL tasks & transfers   Functional Transfers Sit to stand:   SBA    Stand to sit:   SBA                        Marcellus  sit<>stand/functional bathroom transfers using AD/DME as needed for balance and safety   Functional Mobility SBA  No device                       Marcellus   functional/bathroom mobility using AD as needed & demonstrating G safety     Balance Sitting:     Static:  S    Dynamic:SBA  Standing: SBA  Marcellus dynamic sitting balance; Marcellus dynamic standing balance  during ADL tasks & transfers   Endurance/  Activity Tolerance   F tolerance with moderate activity.    G   tolerance with moderate activity/self care routine   Visual/  Perceptual   WFL                     Vitals:   HR at rest: 68 bpm HR at end of session: 80 bpm   Spo2 at rest:100% Spo2 at end of session 100%   BP at rest:131/83 mmHg BP at end of session 160/84 mmHg       Treatment: OT treatment provided this date includes:  Bed mobility: Instruction on precautions prior to bed mobility to facilitate safe transfers and ADLS. Balance retraining: Performed sitting/standing balance ex's with instruction to facilitate righting reactions with postural changes during ADLS. Pt with no LOB. ADL retraining: Instruction on adapted techniques to increase independence and safe reach during dressing/bathing activities. Pt demonstrated G understanding. Energy conservation: Education on breathing techniques, pacing, work simplification strategies & recommended bathroom DME for safety and energy conservation during self care tasks and activities of daily living. Line management and environmental modifications made prior to and end of session to ensure patient safety and to increase efficiency of session. Skilled monitoring of HR, O2 saturation, blood pressure and patient's response to activity performed throughout session. Comments: OK from RN to see patient. Upon arrival, patient supine in bed, agreeable to session. Pt demo fair tolerance with good understanding of education/techniques. At end of session, patient left seated in chair; family present. Call light within reach, all lines and tubes intact. Pt instructed on use of call light for assistance and fall prevention. .    Patient presents with decreased ROM/strength,activity tolerance, dynamic balance, functional mobility limiting completion of ADLs and safety. Pt can benefit from continued skilled OT to increase safety, functional independence and quality of life.      Rehab Potential: good for established goals    Patient / Family Goal: to [facility]    Patient and/or family were instructed/educated on diagnosis, prognosis/goals and plan of care. Pt demonstrated G understanding. Evaluation Complexity: Moderate     History: Expanded chart review of consults, imaging, and psychosocial history related to current functional performance. Exam: 5+ performance deficits identified limiting functional independence and safe return home   Assistance/Modification: Min/mod assistance or modifications required to perform tasks. May have comorbidities that affect occupational performance. [] Malnutrition indicators have been identified and nursing has been notified to ensure a dietitian consult is ordered. Time In:1015             Time Out: 1038         Total Treatment time: 8   Min Units   OT Eval Low 68361     OT Eval Medium 32800 X    OT Eval High 48523     OT Re-Eval [id number]     Therapeutic Ex 93077     Therapeutic Activities 01154 8 1   ADL/Self Care 16667     Orthotic Management 19661     Neuro Re-Ed 93569     Non-Billable Time        Evaluation time includes thorough review of current medical information, gathering information on past medical history/social history and prior level of function, completion of standardized testing/informal observation of tasks, assessment of data and development of POC/Goals.      Jose Torres, OTR/L 7425

## 2023-01-17 NOTE — PROGRESS NOTES
Physician Progress Note      PATIENT:               Deya Mims  CSN #:                  [id number]  :                       1945  ADMIT DATE:       2023 3:20 PM  DISCH DATE:  RESPONDING  PROVIDER #:        Rhianna Perkins MD          QUERY TEXT:    Pt admitted with Bilateral Subdural Hematomas. Pt noted to have a rightward   7mm midline shift per CT head. If clinically significant, please document in   progress notes and discharge summary if you are evaluating/treating any of the   following: The medical record reflects the following:  Risk Factors: Multiple falls; Subdural Hematomas  Clinical Indicators:  Per H&P: Bilateral subdural hematoma with shift  23: CT Head: Bilateral mixed subacute and acute subdural hemorrhage, left   greater than right. There is approximately 7mm of rightward midline shift  Treatment: Neurosurgery consult; Keppra; Dayne hole ; subdural drain placement;   Serial CT Scans; Neurological exams    Thank Guillermo LUKE, R.N.  Clinical Documentation Improvement  [phone]  Options provided:  -- Brain compression  -- Traumatic brain compression  -- Other - I will add my own diagnosis  -- Disagree - Not applicable / Not valid  -- Disagree - Clinically unable to determine / Unknown  -- Refer to Clinical Documentation Reviewer    PROVIDER RESPONSE TEXT:    This patient has brain compression.     Query created by: Consuelo Bee on 2023 8:46 AM      Electronically signed by:  Rhianna Perkins MD 2023 8:38 AM

## 2023-01-17 NOTE — PROGRESS NOTES
Comprehensive Nutrition Assessment    Type and Reason for Visit:  Initial (LOS ICU)    Nutrition Recommendations/Plan:     Continue Current Diet, Start Oral Nutrition Supplement     Malnutrition Assessment:  Malnutrition Status:  No malnutrition (01/17/23 1019)    Context:  Acute Illness     Findings of the 6 clinical characteristics of malnutrition:  Energy Intake:  Mild decrease in energy intake  Weight Loss:  No significant weight loss (per EMR review)     Body Fat Loss:  No significant body fat loss     Muscle Mass Loss:  No significant muscle mass loss    Fluid Accumulation:  No significant fluid accumulation     Strength:  Not Performed    Nutrition Assessment:    Pt admit 2/2 fall +SDH s/p Erwin Miners hole w/ left-sided drain placement (removed today). PMHx CAD, AAA/ repair, & CA. PO intake average 50-75% meals. Will add Ensure Compact BID to help aid in recovery. Nutrition Related Findings:    Pt alert, MAP WNL, fluid bal WNL, no edema, active BS, subdural drain removed     Wound Type: Surgical Incision       Current Nutrition Intake & Therapies:    Average Meal Intake: 51-75% (average)    ADULT DIET; Regular    Anthropometric Measures:  Height: 5' 10\" (177.8 cm)  Ideal Body Weight (IBW): 166 lbs (75 kg)    Admission Body Weight: 172 lb (78 kg) (1/14 first measured)  Current Body Weight: 172 lb (78 kg) (1/14 adm wt as CBW slightly elevated), 103.6 % IBW. Current BMI (kg/m2): 24.7  Usual Body Weight: 171 lb 6.4 oz (77.7 kg) (9/24/22 EMR measured from nephrology office visit)  % Weight Change (Calculated): 0.4,                     BMI Categories: Normal Weight (BMI 18.5-24. 9)    Estimated Daily Nutrient Needs:  Energy Requirements Based On: Formula  Weight Used for Energy Requirements: Current  Energy (kcal/day): MSJ 1545 x 1.2 SF= 4608-3494  Protein (g/day): 1.3-1.5 g/kg IBW; 100-115  Fluid (ml/day): per critical care    Nutrition Diagnosis:   Inadequate oral intake related to cognitive or neurological impairment (SDH) as evidenced by intake 51-75%    Nutrition Interventions:   Nutrition Education/Counseling: Education not indicated  Coordination of Nutrition Care: Continue to monitor while inpatient      Goals:    Goals: PO intake 75% or greater, by next RD assessment      Nutrition Monitoring and Evaluation:     Food/Nutrient Intake Outcomes: Food and Nutrient Intake, Supplement Intake  Physical Signs/Symptoms Outcomes: Biochemical Data, Nutrition Focused Physical Findings, Skin, Weight, GI Status, Fluid Status or Edema, Hemodynamic Status    Discharge Planning:     Too soon to determine     Christina Eaton RD, LD  Contact: Ext [phone]

## 2023-01-17 NOTE — CARE COORDINATION
Pod #3 . Subdural drain removed today. PT/OT [facility] scores 18 today. Dc plan is to return home with wife when discharged.

## 2023-01-18 VITALS
WEIGHT: 178.1 LBS | BODY MASS INDEX: 25.5 KG/M2 | SYSTOLIC BLOOD PRESSURE: 136 MMHG | HEART RATE: 69 BPM | DIASTOLIC BLOOD PRESSURE: 72 MMHG | OXYGEN SATURATION: 98 % | RESPIRATION RATE: 15 BRPM | HEIGHT: 70 IN | TEMPERATURE: 98.2 F

## 2023-01-18 LAB
ALBUMIN SERPL-MCNC: 3.3 G/DL (ref 3.5–5.2)
ALP BLD-CCNC: 68 U/L (ref 40–129)
ALT SERPL-CCNC: <5 U/L (ref 0–40)
ANION GAP SERPL CALCULATED.3IONS-SCNC: 10 MMOL/L (ref 7–16)
AST SERPL-CCNC: 13 U/L (ref 0–39)
BILIRUB SERPL-MCNC: 0.4 MG/DL (ref 0–1.2)
BUN BLDV-MCNC: 29 MG/DL (ref 6–23)
CALCIUM SERPL-MCNC: 9.2 MG/DL (ref 8.6–10.2)
CHLORIDE BLD-SCNC: 107 MMOL/L (ref 98–107)
CO2: 22 MMOL/L (ref 22–29)
CREAT SERPL-MCNC: 1.5 MG/DL (ref 0.7–1.2)
GFR SERPL CREATININE-BSD FRML MDRD: 48 ML/MIN/1.73
GLUCOSE BLD-MCNC: 98 MG/DL (ref 74–99)
HCT VFR BLD CALC: 38.6 % (ref 37–54)
HEMOGLOBIN: 12.7 G/DL (ref 12.5–16.5)
MCH RBC QN AUTO: 30.5 PG (ref 26–35)
MCHC RBC AUTO-ENTMCNC: 32.9 % (ref 32–34.5)
MCV RBC AUTO: 92.8 FL (ref 80–99.9)
PDW BLD-RTO: 14 FL (ref 11.5–15)
PLATELET # BLD: 139 E9/L (ref 130–450)
PMV BLD AUTO: 9.9 FL (ref 7–12)
POTASSIUM SERPL-SCNC: 4.2 MMOL/L (ref 3.5–5)
RBC # BLD: 4.16 E12/L (ref 3.8–5.8)
SODIUM BLD-SCNC: 139 MMOL/L (ref 132–146)
TOTAL PROTEIN: 6.2 G/DL (ref 6.4–8.3)
WBC # BLD: 7.3 E9/L (ref 4.5–11.5)

## 2023-01-18 PROCEDURE — 2580000003 HC RX 258: Performed by: STUDENT IN AN ORGANIZED HEALTH CARE EDUCATION/TRAINING PROGRAM

## 2023-01-18 PROCEDURE — 6370000000 HC RX 637 (ALT 250 FOR IP): Performed by: SURGERY

## 2023-01-18 PROCEDURE — 99238 HOSP IP/OBS DSCHRG MGMT 30/<: CPT | Performed by: SURGERY

## 2023-01-18 PROCEDURE — 85027 COMPLETE CBC AUTOMATED: CPT

## 2023-01-18 PROCEDURE — 6370000000 HC RX 637 (ALT 250 FOR IP): Performed by: STUDENT IN AN ORGANIZED HEALTH CARE EDUCATION/TRAINING PROGRAM

## 2023-01-18 PROCEDURE — 36415 COLL VENOUS BLD VENIPUNCTURE: CPT

## 2023-01-18 PROCEDURE — 80053 COMPREHEN METABOLIC PANEL: CPT

## 2023-01-18 RX ORDER — LEVETIRACETAM 500 MG/1
500 TABLET ORAL 2 TIMES DAILY
Qty: 28 TABLET | Refills: 0 | Status: SHIPPED | OUTPATIENT
Start: 2023-01-18 | End: 2023-02-01

## 2023-01-18 RX ADMIN — SERTRALINE 25 MG: 25 TABLET, FILM COATED ORAL at 09:58

## 2023-01-18 RX ADMIN — AMLODIPINE BESYLATE 10 MG: 10 TABLET ORAL at 07:58

## 2023-01-18 RX ADMIN — POLYETHYLENE GLYCOL 3350 17 G: 17 POWDER, FOR SOLUTION ORAL at 07:58

## 2023-01-18 RX ADMIN — SODIUM CHLORIDE, PRESERVATIVE FREE 10 ML: 5 INJECTION INTRAVENOUS at 07:59

## 2023-01-18 RX ADMIN — METOPROLOL TARTRATE 50 MG: 50 TABLET, FILM COATED ORAL at 07:58

## 2023-01-18 RX ADMIN — LEVETIRACETAM 500 MG: 500 TABLET, FILM COATED ORAL at 07:58

## 2023-01-18 RX ADMIN — LEVOTHYROXINE SODIUM 25 MCG: 0.03 TABLET ORAL at 06:27

## 2023-01-18 NOTE — PLAN OF CARE
Problem: Discharge Planning  Goal: Discharge to home or other facility with appropriate resources  1/18/2023 0738 by Echo Howell RN  Outcome: Progressing  1/18/2023 0220 by Sandoval Orr RN  Outcome: Progressing  Flowsheets (Taken 1/15/2023 0800 by Nay Ceron)  Discharge to home or other facility with appropriate resources:   Identify barriers to discharge with patient and caregiver   Arrange for needed discharge resources and transportation as appropriate   Identify discharge learning needs (meds, wound care, etc)     Problem: Skin/Tissue Integrity  Goal: Absence of new skin breakdown  Outcome: Progressing     Problem: Safety - Adult  Goal: Free from fall injury  1/18/2023 0738 by Echo Howell RN  Outcome: Progressing  1/18/2023 0220 by Sandoval Orr RN  Outcome: Progressing  Flowsheets (Taken 1/18/2023 0220)  Free From Fall Injury:   Instruct family/caregiver on patient safety   Based on caregiver fall risk screen, instruct family/caregiver to ask for assistance with transferring infant if caregiver noted to have fall risk factors     Problem: ABCDS Injury Assessment  Goal: Absence of physical injury  Outcome: Progressing     Problem: Pain  Goal: Verbalizes/displays adequate comfort level or baseline comfort level  1/18/2023 0738 by Echo Howell RN  Outcome: Progressing  1/18/2023 0220 by Sandoval Orr RN  Outcome: Progressing  Flowsheets (Taken 1/17/2023 1800 by Echo Howell RN)  Verbalizes/displays adequate comfort level or baseline comfort level:   Encourage patient to monitor pain and request assistance   Assess pain using appropriate pain scale   Administer analgesics based on type and severity of pain and evaluate response   Implement non-pharmacological measures as appropriate and evaluate response   Consider cultural and social influences on pain and pain management     Problem: Nutrition Deficit:  Goal: Optimize nutritional status  1/18/2023 0738 by Echo Howell RN  Outcome: Progressing  1/18/2023 0220 by Olga Lechuga RN  Outcome: Progressing  Flowsheets (Taken 1/18/2023 0220)  Nutrient intake appropriate for improving, restoring, or maintaining nutritional needs:   Assess nutritional status and recommend course of action   Monitor oral intake, labs, and treatment plans     Problem: Neurosensory - Adult  Goal: Achieves stable or improved neurological status  Outcome: Progressing     Problem: Respiratory - Adult  Goal: Achieves optimal ventilation and oxygenation  Outcome: Progressing     Problem: Skin/Tissue Integrity - Adult  Goal: Skin integrity remains intact  Outcome: Progressing     Problem: Musculoskeletal - Adult  Goal: Return mobility to safest level of function  Outcome: Progressing     Problem: Gastrointestinal - Adult  Goal: Minimal or absence of nausea and vomiting  Outcome: Progressing     Problem: Genitourinary - Adult  Goal: Absence of urinary retention  Outcome: Progressing     Problem: Infection - Adult  Goal: Absence of infection during hospitalization  Outcome: Progressing     Problem: Hematologic - Adult  Goal: Maintains hematologic stability  Outcome: Progressing

## 2023-01-18 NOTE — PROGRESS NOTES
Neurosurg progress note  VITALS:  BP (!) 140/83   Pulse 62   Temp 98.4 °F (36.9 °C) (Oral)   Resp 12   Ht 5' 10\" (1.778 m)   Wt 178 lb 1.6 oz (80.8 kg)   SpO2 99%   BMI 25.55 kg/m²   24HR INTAKE/OUTPUT:    Intake/Output Summary (Last 24 hours) at 1/18/2023 0831  Last data filed at 1/18/2023 0600  Gross per 24 hour   Intake 1520 ml   Output 1150 ml   Net 370 ml     CT HEAD WO CONTRAST    Result Date: 1/13/2023  EXAMINATION: CT OF THE HEAD WITHOUT CONTRAST; CT OF THE CERVICAL SPINE WITHOUT CONTRAST 1/13/2023 3:23 pm TECHNIQUE: CT of the head was performed without the administration of intravenous contrast. Automated exposure control, iterative reconstruction, and/or weight based adjustment of the mA/kV was utilized to reduce the radiation dose to as low as reasonably achievable.; CT of the cervical spine was performed without the administration of intravenous contrast. Multiplanar reformatted images are provided for review. Automated exposure control, iterative reconstruction, and/or weight based adjustment of the mA/kV was utilized to reduce the radiation dose to as low as reasonably achievable. COMPARISON: None. HISTORY: ORDERING SYSTEM PROVIDED HISTORY: trip and fall, hit head no LOC, no thinners TECHNOLOGIST PROVIDED HISTORY: Has a \"code stroke\" or \"stroke alert\" been called? ->No Reason for exam:->trip and fall, hit head no LOC, no thinners Decision Support Exception - unselect if not a suspected or confirmed emergency medical condition->Emergency Medical Condition (MA) What reading provider will be dictating this exam?->CRC FINDINGS: CT head: There are bilateral subdural hemorrhages. These are predominantly isodense to cerebral cortex. The isodense blood could be hyperacute or subacute. There are areas of hyperdensity in both subdural collections, left greater than right. Hyperdense areas are consistent with acute hemorrhage.   The left frontoparietal hemorrhage measures approximately 2.6 cm in thickness while the right frontoparietal subdural hemorrhage measures approximately 1.0 cm in greatest thickness. There is approximately 7 mm of rightward midline shift. There is compression of the anterior horn left lateral ventricle with effacement of the atrium and posterior horn of the left lateral ventricle. There is effacement of the left frontoparietal sulci. There is no evidence of acute cortical ischemia. The calvarium is intact. The visualized portions of the paranasal sinuses and left mastoid air cells are clear. There is small right mastoid effusion. There is mild sclerosis at the inferior aspect of the right mastoid air cells. CT cervical spine: Examination is mildly degraded by motion. However, there is no evidence of acute fracture. There is grade 1 anterolisthesis at C3-4 and C4-5. The remaining alignment is anatomic. There are no compression deformities. There is narrowing of the intervertebral disc space heights at C4-5 through C6-7. There is lucency through the base of a posterior endplate osteophyte in the midline at C4 which appears likely chronic in nature. The facet joints are intact at all levels. Facet hypertrophy is most pronounced on the left at C3-4 and on the right at C4-5. The prevertebral soft tissue structures are unremarkable. There is multilevel central canal stenosis and neural foraminal narrowing. There is arteriosclerosis. 1. Bilateral mixed subacute and acute subdural hemorrhage, left greater than right. There is approximately 7 mm of rightward midline shift. No evidence of transtentorial herniation. 2. No acute fracture or subluxation within the cervical spine. 3. Degenerative disc disease and facet arthropathy in the cervical spine with multilevel central canal stenosis and neural foraminal narrowing. Findings discussed with Shoaib Moore via telephone on 01/13/2023 at 1610 hours [place] Standard time.      CT CHEST W CONTRAST    Result Date: 1/13/2023  EXAMINATION: CT OF THE CHEST WITH CONTRAST 1/13/2023 7:44 pm TECHNIQUE: CT of the chest was performed with the administration of intravenous contrast. Multiplanar reformatted images are provided for review. Automated exposure control, iterative reconstruction, and/or weight based adjustment of the mA/kV was utilized to reduce the radiation dose to as low as reasonably achievable. COMPARISON: August 16, 2019 HISTORY: ORDERING SYSTEM PROVIDED HISTORY: trauma TECHNOLOGIST PROVIDED HISTORY: Reason for exam:->trauma What reading provider will be dictating this exam?->CRC FINDINGS: The heart is normal in size. No pericardial effusion. Atherosclerotic calcifications are associated with the coronary arteries. No abnormal mediastinal fluid collections. Atherosclerotic plaque associated with the thoracic aorta. Descending thoracic aorta is ectatic measuring up to 3.8 cm. No airspace opacity or pleural effusion. Redemonstration of opacities in peripheral aspect of the superior segment of left lower lobe related to scarring or subsegmental atelectasis. Stable nodule located in lingula measures 5 mm. Stable nodule located in anterior aspect of right middle lobe measures approximately 5 mm. There are expanded airspaces in the upper lobes with multiple bulla and blebs. No pneumothorax. Stable focus of increased attenuation associated with musculature along anterior margin of right scapula measuring up to 8 mm. No evidence of right or left-sided rib fracture. 1. No acute process in the chest. 2. Emphysematous changes. 3. Redemonstration of opacities in peripheral aspect of left lower lobe which could indicate stable scarring or subsegmental atelectasis 4. Redemonstration of stable nodules bilaterally. Follow-up CT could be helpful based on patient risk factors.      CT CERVICAL SPINE WO CONTRAST    Result Date: 1/13/2023  EXAMINATION: CT OF THE HEAD WITHOUT CONTRAST; CT OF THE CERVICAL SPINE WITHOUT CONTRAST 1/13/2023 3:23 pm TECHNIQUE: CT of the head was performed without the administration of intravenous contrast. Automated exposure control, iterative reconstruction, and/or weight based adjustment of the mA/kV was utilized to reduce the radiation dose to as low as reasonably achievable.; CT of the cervical spine was performed without the administration of intravenous contrast. Multiplanar reformatted images are provided for review. Automated exposure control, iterative reconstruction, and/or weight based adjustment of the mA/kV was utilized to reduce the radiation dose to as low as reasonably achievable. COMPARISON: None. HISTORY: ORDERING SYSTEM PROVIDED HISTORY: trip and fall, hit head no LOC, no thinners TECHNOLOGIST PROVIDED HISTORY: Has a \"code stroke\" or \"stroke alert\" been called? ->No Reason for exam:->trip and fall, hit head no LOC, no thinners Decision Support Exception - unselect if not a suspected or confirmed emergency medical condition->Emergency Medical Condition (MA) What reading provider will be dictating this exam?->CRC FINDINGS: CT head: There are bilateral subdural hemorrhages. These are predominantly isodense to cerebral cortex. The isodense blood could be hyperacute or subacute. There are areas of hyperdensity in both subdural collections, left greater than right. Hyperdense areas are consistent with acute hemorrhage. The left frontoparietal hemorrhage measures approximately 2.6 cm in thickness while the right frontoparietal subdural hemorrhage measures approximately 1.0 cm in greatest thickness. There is approximately 7 mm of rightward midline shift. There is compression of the anterior horn left lateral ventricle with effacement of the atrium and posterior horn of the left lateral ventricle. There is effacement of the left frontoparietal sulci. There is no evidence of acute cortical ischemia. The calvarium is intact.   The visualized portions of the paranasal sinuses and left mastoid air cells are clear. There is small right mastoid effusion. There is mild sclerosis at the inferior aspect of the right mastoid air cells. CT cervical spine: Examination is mildly degraded by motion. However, there is no evidence of acute fracture. There is grade 1 anterolisthesis at C3-4 and C4-5. The remaining alignment is anatomic. There are no compression deformities. There is narrowing of the intervertebral disc space heights at C4-5 through C6-7. There is lucency through the base of a posterior endplate osteophyte in the midline at C4 which appears likely chronic in nature. The facet joints are intact at all levels. Facet hypertrophy is most pronounced on the left at C3-4 and on the right at C4-5. The prevertebral soft tissue structures are unremarkable. There is multilevel central canal stenosis and neural foraminal narrowing. There is arteriosclerosis. 1. Bilateral mixed subacute and acute subdural hemorrhage, left greater than right. There is approximately 7 mm of rightward midline shift. No evidence of transtentorial herniation. 2. No acute fracture or subluxation within the cervical spine. 3. Degenerative disc disease and facet arthropathy in the cervical spine with multilevel central canal stenosis and neural foraminal narrowing. Findings discussed with Natali Barroso via telephone on 01/13/2023 at 1610 hours [facility]. CT ABDOMEN PELVIS W IV CONTRAST Additional Contrast? None    Result Date: 1/13/2023  EXAMINATION: CT OF THE ABDOMEN AND PELVIS WITH CONTRAST 1/13/2023 7:44 pm TECHNIQUE: CT of the abdomen and pelvis was performed with the administration of intravenous contrast. Multiplanar reformatted images are provided for review. Automated exposure control, iterative reconstruction, and/or weight based adjustment of the mA/kV was utilized to reduce the radiation dose to as low as reasonably achievable.  COMPARISON: November 18, 2007 HISTORY: [address] PROVIDED HISTORY: trauma TECHNOLOGIST PROVIDED HISTORY: Additional Contrast?->None Reason for exam:->trauma What reading provider will be dictating this exam?->CRC FINDINGS: Evidence of prior surgery and aortic repair. There is a fusiform infrarenal abdominal aortic aneurysm measuring up to 3.2 cm appearing slightly larger compared to prior. There is aneurysmal dilatation involving right and left common iliac arteries measuring up to 1.8 cm on the right and 1.9 cm on the left. No retroperitoneal fluid collections. No free air or free fluid. There are numerous diverticula associated with the sigmoid colon. The appendix is visualized and appears unremarkable. No intrahepatic bile duct dilatation. Cyst or hemangioma associated with right hepatic lobe measuring up to 8 mm. Multiple gallstones present in the gallbladder. No evidence of acute pancreatitis. Spleen is normal in size. Adrenal glands are normal. No hydronephrosis. No perinephric edema. Urinary bladder is unremarkable. No hip fracture or dislocation. No pelvis fracture. No evidence of compression fracture involving the lumbar spine. 1. No acute process in abdomen or pelvis. 2. Diverticulosis. 3. Cholelithiasis. 4. Infrarenal abdominal aortic aneurysm measures up to 3.2 cm appearing slightly larger compared to prior from November 18, 2007. XR CHEST PORTABLE    Result Date: 1/13/2023  EXAMINATION: ONE XRAY VIEW OF THE CHEST 1/13/2023 4:57 pm COMPARISON: Chest, single view 11/18/2007 HISTORY: ORDERING SYSTEM PROVIDED HISTORY: Shortness of breath TECHNOLOGIST PROVIDED HISTORY: Reason for exam:->Shortness of breath What reading provider will be dictating this exam?->CRC FINDINGS: Single portable AP view of the chest was obtained. Heart size likely within normal limits. Mediastinal contour and pulmonary vascularity are within normal limits. There is biapical pleuroparenchymal scarring.   There is diffuse prominence of the pulmonary interstitium. There is stable blunting of the left lateral costophrenic angle. No acute cardiopulmonary disease. CBC:   Lab Results   Component Value Date/Time    WBC 7.3 01/18/2023 05:20 AM    RBC 4.16 01/18/2023 05:20 AM    HGB 12.7 01/18/2023 05:20 AM    HCT 38.6 01/18/2023 05:20 AM    MCV 92.8 01/18/2023 05:20 AM    MCH 30.5 01/18/2023 05:20 AM    MCHC 32.9 01/18/2023 05:20 AM    RDW 14.0 01/18/2023 05:20 AM     01/18/2023 05:20 AM    MPV 9.9 01/18/2023 05:20 AM     BMP:    Lab Results   Component Value Date/Time     01/18/2023 05:20 AM    K 4.2 01/18/2023 05:20 AM    K 3.7 01/15/2023 02:25 PM     01/18/2023 05:20 AM    CO2 22 01/18/2023 05:20 AM    BUN 29 01/18/2023 05:20 AM    LABALBU 3.3 01/18/2023 05:20 AM    CREATININE 1.5 01/18/2023 05:20 AM    CALCIUM 9.2 01/18/2023 05:20 AM    GFRAA 45 03/24/2022 04:00 AM    LABGLOM 48 01/18/2023 05:20 AM    GLUCOSE 98 01/18/2023 05:20 AM      levothyroxine  25 mcg Oral Daily    sertraline  25 mg Oral Daily    sodium chloride flush  5-40 mL IntraVENous 2 times per day    polyethylene glycol  17 g Oral BID    levETIRAcetam  500 mg Oral BID    amLODIPine  10 mg Oral Daily    metoprolol tartrate  50 mg Oral BID    pravastatin  80 mg Oral Nightly     Drain has been removed patient is awake alert oriented friendly cooperative follows complex commands speech is fluent pupils equal round reactive to light  Assessment:  Patient Active Problem List   Diagnosis    S/P AAA (abdominal aortic aneurysm) repair    Prominent abdominal aortic pulse    History of tobacco use    SDH (subdural hematoma)    Palliative care encounter    Abdominal aortic aneurysm (AAA) without rupture     Plan:  May be discharged home from a neurosurgery standpoint must be with reliable adult 24/7 no driving continue Keppra needs a head CT scan no contrast in 2 weeks follow-up office visit with neurosurgery   Matias Katz MD M.D.

## 2023-01-18 NOTE — PLAN OF CARE
Problem: Discharge Planning  Goal: Discharge to home or other facility with appropriate resources  Outcome: Progressing  Flowsheets (Taken 1/15/2023 0800 by Jeraline Mcardle)  Discharge to home or other facility with appropriate resources:   Identify barriers to discharge with patient and caregiver   Arrange for needed discharge resources and transportation as appropriate   Identify discharge learning needs (meds, wound care, etc)     Problem: Safety - Adult  Goal: Free from fall injury  Outcome: Progressing  Flowsheets (Taken 1/18/2023 0220)  Free From Fall Injury:   Instruct family/caregiver on patient safety   Based on caregiver fall risk screen, instruct family/caregiver to ask for assistance with transferring infant if caregiver noted to have fall risk factors     Problem: Pain  Goal: Verbalizes/displays adequate comfort level or baseline comfort level  Outcome: Progressing  Flowsheets (Taken 1/17/2023 1800 by Alton Anguiano RN)  Verbalizes/displays adequate comfort level or baseline comfort level:   Encourage patient to monitor pain and request assistance   Assess pain using appropriate pain scale   Administer analgesics based on type and severity of pain and evaluate response   Implement non-pharmacological measures as appropriate and evaluate response   Consider cultural and social influences on pain and pain management     Problem: Nutrition Deficit:  Goal: Optimize nutritional status  Outcome: Progressing  Flowsheets (Taken 1/18/2023 0220)  Nutrient intake appropriate for improving, restoring, or maintaining nutritional needs:   Assess nutritional status and recommend course of action   Monitor oral intake, labs, and treatment plans

## 2023-01-18 NOTE — PROGRESS NOTES
Rn called wife and they are on the way to take pt home. All discharge instructions were explained to pt and a copy will go home with him for his wife to see. All iv's were d/c  and all belongings sent home with pt. Pt has no questions on discharge instructions.

## 2023-01-18 NOTE — PROGRESS NOTES
CC: SDH    ASSESSMENT:  SDH - s/p taz hole drainage    PLAN:  Discharge home today. FU NSGY    SIGNIFICANT 24 HOUR EVENTS/NEW COMPLAINTS:  Did well last night. Drain removed yesterday. No acute events. No new complaints.        PHYSICAL EXAM:  General - 69 yo white man  Neuro - Awake, alert, attentive   Head/Face/Neck - left crani site intact  CV - normal sinus rhythm    Pulm - room air  Chest - non labored

## 2023-01-30 ENCOUNTER — HOSPITAL ENCOUNTER (OUTPATIENT)
Dept: CT IMAGING | Age: 78
Discharge: HOME | End: 2023-02-01
Payer: MEDICARE

## 2023-01-30 DIAGNOSIS — S06.5XAA SDH (SUBDURAL HEMATOMA): ICD-10-CM

## 2023-01-30 PROCEDURE — 70450 CT HEAD/BRAIN W/O DYE: CPT

## 2023-02-01 RX ORDER — LEVETIRACETAM 500 MG/1
TABLET ORAL
Qty: 28 TABLET | Refills: 0 | OUTPATIENT
Start: 2023-02-01

## 2023-02-08 ENCOUNTER — HOSPITAL ENCOUNTER (OUTPATIENT)
Dept: CT IMAGING | Age: 78
Discharge: HOME | End: 2023-02-10
Payer: MEDICARE

## 2023-02-08 DIAGNOSIS — S06.5XAA SDH (SUBDURAL HEMATOMA): ICD-10-CM

## 2023-02-08 PROCEDURE — 70450 CT HEAD/BRAIN W/O DYE: CPT

## 2023-03-01 ENCOUNTER — HOSPITAL ENCOUNTER (OUTPATIENT)
Dept: CT IMAGING | Age: 78
Discharge: HOME | End: 2023-03-03
Payer: MEDICARE

## 2023-03-01 DIAGNOSIS — S06.5XAA SDH (SUBDURAL HEMATOMA): ICD-10-CM

## 2023-03-01 PROCEDURE — 70450 CT HEAD/BRAIN W/O DYE: CPT

## 2023-04-04 ENCOUNTER — HOSPITAL ENCOUNTER (OUTPATIENT)
Dept: CT IMAGING | Age: 78
Discharge: HOME | End: 2023-04-06
Payer: MEDICARE

## 2023-04-04 DIAGNOSIS — S06.5XAA SDH (SUBDURAL HEMATOMA) (HCC): ICD-10-CM

## 2023-04-04 PROCEDURE — 70450 CT HEAD/BRAIN W/O DYE: CPT

## 2023-07-20 ENCOUNTER — OFFICE VISIT (OUTPATIENT)
Dept: VASCULAR SURGERY | Age: 78
End: 2023-07-20
Payer: MEDICARE

## 2023-07-20 VITALS — BODY MASS INDEX: 24.62 KG/M2 | HEIGHT: 70 IN | WEIGHT: 172 LBS

## 2023-07-20 DIAGNOSIS — Z98.890 S/P AAA (ABDOMINAL AORTIC ANEURYSM) REPAIR: Chronic | ICD-10-CM

## 2023-07-20 DIAGNOSIS — R09.89 PROMINENT ABDOMINAL AORTIC PULSE: Primary | ICD-10-CM

## 2023-07-20 DIAGNOSIS — Z86.79 S/P AAA (ABDOMINAL AORTIC ANEURYSM) REPAIR: Chronic | ICD-10-CM

## 2023-07-20 PROBLEM — I71.40 ABDOMINAL AORTIC ANEURYSM (AAA) WITHOUT RUPTURE (HCC): Status: RESOLVED | Noted: 2023-01-16 | Resolved: 2023-07-20

## 2023-07-20 PROCEDURE — 1036F TOBACCO NON-USER: CPT | Performed by: SURGERY

## 2023-07-20 PROCEDURE — G8420 CALC BMI NORM PARAMETERS: HCPCS | Performed by: SURGERY

## 2023-07-20 PROCEDURE — G8427 DOCREV CUR MEDS BY ELIG CLIN: HCPCS | Performed by: SURGERY

## 2023-07-20 PROCEDURE — 1123F ACP DISCUSS/DSCN MKR DOCD: CPT | Performed by: SURGERY

## 2023-07-20 PROCEDURE — 99214 OFFICE O/P EST MOD 30 MIN: CPT | Performed by: SURGERY

## 2023-07-20 NOTE — PROGRESS NOTES
Chief Complaint:   Chief Complaint   Patient presents with    Check-Up     S/p AAA         HPI: Patient came to the office, for 2 years, postresection of abdominal aortic aneurysm many years ago, doing well, denies any abdominal pain or back pain    Patient tells me that earlier this year he fell down and had multiple CAT scans done, will do CAT scan with CT scan of the abdomen    Patient also has renal insufficiency with creatinine 1.8, currently following with a nephrologist      Patient denies any focal lateralizing neurological symptoms like loss of speech, vision or loss of function of extremity    Patient can walk a few blocks , and denies any symptoms of rest pain    No Known Allergies    Current Outpatient Medications   Medication Sig Dispense Refill    rosuvastatin (CRESTOR) 20 MG tablet Take 1 tablet by mouth daily      sertraline (ZOLOFT) 25 MG tablet Take 1 tablet by mouth daily      levothyroxine (SYNTHROID) 25 MCG tablet Take 1 tablet by mouth Daily      metoprolol (LOPRESSOR) 50 MG tablet Take 1 tablet by mouth 2 times daily      amlodipine (NORVASC) 10 MG tablet Take 1 tablet by mouth daily      vitamin D (CHOLECALCIFEROL) 50 MCG (2000 UT) TABS tablet Take 1 tablet by mouth daily      levETIRAcetam (KEPPRA) 500 MG tablet Take 1 tablet by mouth 2 times daily 60 tablet 2     No current facility-administered medications for this visit.        Past Medical History:   Diagnosis Date    Abdominal aortic aneurysm ([address])     Bruising tendency     CAD (coronary artery disease)     Cancer ([address])     bladder s/p resection 1994    History of heart attack 2004    Hyperlipidemia     Hypertension     Hypothyroidism     Prominent abdominal aortic pulse 07/22/2021    S/P AAA (abdominal aortic aneurysm) repair 04/19/2012       Past Surgical History:   Procedure Laterality Date    ABDOMINAL AORTIC ANEURYSM REPAIR, OPEN  2007    AAA repair and aorto-iliac bypass    BLADDER SURGERY  1994    resection for carcinoma

## 2025-07-17 ENCOUNTER — OFFICE VISIT (OUTPATIENT)
Dept: VASCULAR SURGERY | Age: 80
End: 2025-07-17
Payer: MEDICARE

## 2025-07-17 DIAGNOSIS — Z87.891 HISTORY OF TOBACCO USE: ICD-10-CM

## 2025-07-17 DIAGNOSIS — R09.89 PROMINENT ABDOMINAL AORTIC PULSE: ICD-10-CM

## 2025-07-17 DIAGNOSIS — Z98.890 S/P AAA (ABDOMINAL AORTIC ANEURYSM) REPAIR: Primary | Chronic | ICD-10-CM

## 2025-07-17 DIAGNOSIS — Z86.79 S/P AAA (ABDOMINAL AORTIC ANEURYSM) REPAIR: Primary | Chronic | ICD-10-CM

## 2025-07-17 PROCEDURE — G8427 DOCREV CUR MEDS BY ELIG CLIN: HCPCS | Performed by: SURGERY

## 2025-07-17 PROCEDURE — 99214 OFFICE O/P EST MOD 30 MIN: CPT | Performed by: SURGERY

## 2025-07-17 PROCEDURE — 1036F TOBACCO NON-USER: CPT | Performed by: SURGERY

## 2025-07-17 PROCEDURE — G8421 BMI NOT CALCULATED: HCPCS | Performed by: SURGERY

## 2025-07-17 PROCEDURE — 1123F ACP DISCUSS/DSCN MKR DOCD: CPT | Performed by: SURGERY

## 2025-07-17 PROCEDURE — 1159F MED LIST DOCD IN RCRD: CPT | Performed by: SURGERY

## 2025-07-17 NOTE — PROGRESS NOTES
weakness.  Neurologic:  No paralysis, paresis, paresthesia, seizures or headaches  Hematologic/Lymphatic/Immunologic:  No anemia, abnormal bleeding/bruising, fever, chills or night sweats.  Endocrine:  No heat or cold intolerance.  No polyphagia, polydipsia or polyuria.      Physical Exam:  General appearance:  Alert, awake, oriented x 3.  No distress.  Skin:  Warm and dry  Head:  Normocephalic.  No masses, lesions or tenderness  Eyes:  Conjunctivae appear normal; PERRL  Ears:  External ears normal  Nose/Sinuses:  Septum midline, mucosa normal; no drainage  Oropharynx:  Clear, no exudate noted  Neck:  No jugular venous distention, lymphadenopathy or thyromegaly.  No evidence of carotid bruit  Lungs:  Clear to ausculation bilaterally.  No rhonchi, crackles, wheezes  Heart:  Regular rate and rhythm.  No rub or murmur  Abdomen:  Soft, non-tender.  No masses, organomegaly.  Prominent pulse noted on deep palpation nontender  Musculoskeletal : No joint effusions, tenderness swelling    Neuro: Speech is intact. Moving all extremities. No focal motor or sensory deficits      Extremities:  Both feet are warm to touch. The color of both feet is normal.    No clinical evidence of popliteal artery aneurysm    Pulses Right  Left    Brachial 3 3    Radial    3=normal   Femoral 3 3  2=diminished   Popliteal    1=barely palpable   Dorsalis pedis    0=absent   Posterior tibial 2 2  4=aneurysmal             Other pertinent information:1. The past medical records were reviewed.    2.  Last CT scan done in January 2023 was reviewed again    Assessment:    1. S/P AAA (abdominal aortic aneurysm) repair    2. Prominent abdominal aortic pulse    3. History of tobacco use              Plan:       Discussed with patient regarding all options, risks benefits, recommended follow-up ultrasound for monitoring of the dilatation previously placed aortic graft,, to the hospital for abdominal pain or back pain          Patient was instructed to

## 2025-07-18 ENCOUNTER — HOSPITAL ENCOUNTER (OUTPATIENT)
Dept: CARDIOLOGY | Age: 80
Discharge: HOME | End: 2025-07-20
Payer: MEDICARE

## 2025-07-18 DIAGNOSIS — Z98.890 S/P AAA (ABDOMINAL AORTIC ANEURYSM) REPAIR: Chronic | ICD-10-CM

## 2025-07-18 DIAGNOSIS — Z86.79 S/P AAA (ABDOMINAL AORTIC ANEURYSM) REPAIR: Chronic | ICD-10-CM

## 2025-07-18 DIAGNOSIS — R09.89 PROMINENT ABDOMINAL AORTIC PULSE: ICD-10-CM

## 2025-07-18 PROCEDURE — 93976 VASCULAR STUDY: CPT

## 2025-07-19 LAB
VAS AORTA DIST AP: 3.04 CM
VAS AORTA DIST TR: 3.04 CM
VAS AORTA MID AP: 2.57 CM
VAS AORTA MID TRANS: 2.77 CM
VAS LEFT COM ILIAC AP: 1.53 CM
VAS LEFT COM ILIAC TRANS: 1.64 CM
VAS RIGHT COM ILIAC AP: 1.43 CM
VAS RIGHT COM ILIAC TRANS: 1.43 CM

## 2025-07-19 PROCEDURE — 93976 VASCULAR STUDY: CPT | Performed by: SURGERY

## 2025-07-22 ENCOUNTER — TELEPHONE (OUTPATIENT)
Dept: VASCULAR SURGERY | Age: 80
End: 2025-07-22

## 2025-07-22 NOTE — TELEPHONE ENCOUNTER
Notified patient no change in doppler results fro previous exam, keep appointment to see Dr. Alston in 2 years.

## (undated) DEVICE — TAPE ADH W2INXL10YD WHT PAPR GENTLE BRTH FLX COMFORTABLE

## (undated) DEVICE — SYRINGE,TOOMEY,IRRIGATION,70CC,STERILE: Brand: MEDLINE

## (undated) DEVICE — SOLUTION IV IRRIG POUR BRL 0.9% SODIUM CHL 2F7124

## (undated) DEVICE — GLOVE SURG 8.5 PF POLYMER WHT STRL SIGN LTX ESSENTIAL LTX

## (undated) DEVICE — HERMETIC™ LARGE-STYLE VENTRICULAR CATHETER SET: Brand: HERMETIC™

## (undated) DEVICE — CATHETER URETH 10FR RED RUB INTMIT ALL PURP 12 PER CA

## (undated) DEVICE — CRANI: Brand: MEDLINE INDUSTRIES, INC.

## (undated) DEVICE — GOWN,SIRUS,FABRNF,L,20/CS: Brand: MEDLINE

## (undated) DEVICE — APPLICATOR PREP 26ML 0.7% IOD POVACRYLEX 74% ISO ALC ST

## (undated) DEVICE — SYRINGE 20ML LL S/C 50

## (undated) DEVICE — 6.0MM X-COARSE DIAMOND

## (undated) DEVICE — GOWN,SIRUS,FABRNF,2XL,18/CS: Brand: MEDLINE

## (undated) DEVICE — 3M™ TEGADERM™ TRANSPARENT FILM DRESSING FRAME STYLE, 1626W, 4 IN X 4-3/4 IN (10 CM X 12 CM), 50/CT 4CT/CASE: Brand: 3M™ TEGADERM™

## (undated) DEVICE — STANDARD HYPODERMIC NEEDLE,ALUMINUM HUB: Brand: MONOJECT

## (undated) DEVICE — SPONGE GZ W2XL2IN NONWOVEN 4 PLY FASTER WICKING ABIL AVANT